# Patient Record
Sex: FEMALE | Race: WHITE | Employment: OTHER | URBAN - METROPOLITAN AREA
[De-identification: names, ages, dates, MRNs, and addresses within clinical notes are randomized per-mention and may not be internally consistent; named-entity substitution may affect disease eponyms.]

---

## 2017-10-04 ENCOUNTER — OFFICE VISIT (OUTPATIENT)
Dept: ENDOCRINOLOGY | Age: 82
End: 2017-10-04

## 2017-10-04 VITALS
SYSTOLIC BLOOD PRESSURE: 156 MMHG | DIASTOLIC BLOOD PRESSURE: 57 MMHG | RESPIRATION RATE: 16 BRPM | BODY MASS INDEX: 33.63 KG/M2 | HEIGHT: 60 IN | OXYGEN SATURATION: 95 % | WEIGHT: 171.3 LBS | HEART RATE: 68 BPM

## 2017-10-04 DIAGNOSIS — E11.22 TYPE 2 DIABETES MELLITUS WITH STAGE 3 CHRONIC KIDNEY DISEASE, WITH LONG-TERM CURRENT USE OF INSULIN (HCC): ICD-10-CM

## 2017-10-04 DIAGNOSIS — E11.49: ICD-10-CM

## 2017-10-04 DIAGNOSIS — N18.30 TYPE 2 DIABETES MELLITUS WITH STAGE 3 CHRONIC KIDNEY DISEASE, WITH LONG-TERM CURRENT USE OF INSULIN (HCC): ICD-10-CM

## 2017-10-04 DIAGNOSIS — Z79.4 TYPE 2 DIABETES MELLITUS WITH HYPERGLYCEMIA, WITH LONG-TERM CURRENT USE OF INSULIN (HCC): Primary | ICD-10-CM

## 2017-10-04 DIAGNOSIS — Z79.4 TYPE 2 DIABETES MELLITUS WITH STAGE 3 CHRONIC KIDNEY DISEASE, WITH LONG-TERM CURRENT USE OF INSULIN (HCC): ICD-10-CM

## 2017-10-04 DIAGNOSIS — M54.10: ICD-10-CM

## 2017-10-04 DIAGNOSIS — E11.65 TYPE 2 DIABETES MELLITUS WITH HYPERGLYCEMIA, WITH LONG-TERM CURRENT USE OF INSULIN (HCC): Primary | ICD-10-CM

## 2017-10-04 DIAGNOSIS — I10 ESSENTIAL HYPERTENSION: ICD-10-CM

## 2017-10-04 RX ORDER — INSULIN DEGLUDEC 100 U/ML
14 INJECTION, SOLUTION SUBCUTANEOUS
COMMUNITY
End: 2022-10-20 | Stop reason: ALTCHOICE

## 2017-10-04 RX ORDER — TRAZODONE HYDROCHLORIDE 50 MG/1
50 TABLET ORAL
COMMUNITY
End: 2022-10-20 | Stop reason: ALTCHOICE

## 2017-10-04 RX ORDER — CELECOXIB 200 MG/1
200 CAPSULE ORAL
COMMUNITY
End: 2022-10-20

## 2017-10-04 NOTE — MR AVS SNAPSHOT
Visit Information Date & Time Provider Department Dept. Phone Encounter #  
 10/4/2017  3:00 PM Piedad Allred MD [facility] [phone] [id number] Upcoming Health Maintenance Date Due DTaP/Tdap/Td series (1 - Tdap) 6/29/1953 Pneumococcal 65+ Low/Medium Risk (1 of 2 - PCV13) 6/29/1997 EYE EXAM RETINAL OR DILATED Q1 6/30/2015 FOOT EXAM Q1 2/11/2016 MICROALBUMIN Q1 2/11/2016 HEMOGLOBIN A1C Q6M 5/6/2016 GLAUCOMA SCREENING Q2Y 6/30/2016 LIPID PANEL Q1 11/6/2016 MEDICARE YEARLY EXAM 11/20/2016 INFLUENZA AGE 9 TO ADULT 8/1/2017 COLONOSCOPY 10/27/2024 Allergies as of 10/4/2017  Review Complete On: 10/4/2017 By: Piedad Allred MD  
  
 Severity Noted Reaction Type Reactions Amoxicillin  04/01/2010    Hives, Itching, Nausea and Vomiting Other Medication  04/01/2010   Intolerance Nausea Only Tylenol with codeine and flexaril Prednisone  04/01/2010    Itching, Nausea and Vomiting Current Immunizations  Reviewed on 10/28/2014 Name Date Influenza Vaccine 10/27/2014, 9/19/2013 Influenza Vaccine Split 10/17/2012, 10/21/2010 Not reviewed this visit You Were Diagnosed With   
  
 Codes Comments Type 2 diabetes mellitus with hyperglycemia, with long-term current use of insulin (HCC)    -  Primary ICD-10-CM: E11.65, Z79.4 ICD-9-CM: 250.00, 790.29, V58.67 Essential hypertension     ICD-10-CM: I10 
ICD-9-CM: 401.9 Vitals BP Pulse Resp Height(growth percentile) Weight(growth percentile) LMP  
 156/57 (BP 1 Location: Left arm, BP Patient Position: Sitting) 68 16 5' (1.524 m) 171 lb 4.8 oz (77.7 kg) 01/01/1987 SpO2 BMI OB Status Smoking Status 95% 33.45 kg/m2 Postmenopausal Former Smoker BMI and BSA Data Body Mass Index Body Surface Area  
 33.45 kg/m 2 1.81 m 2 Preferred Pharmacy Pharmacy Name Phone  [facility] [address] [phone] [facility] Records [phone] Your Updated Medication List  
  
   
This list is accurate as of: 10/4/17  3:02 PM.  Always use your most recent med list.  
  
  
  
  
 ALIGN 4 mg Cap Generic drug:  Bifidobacterium Infantis Take  by mouth. ALPRAZolam 0.25 mg tablet Commonly known as:  [place] Take 1 Tab by mouth nightly as needed. CeleBREX 200 mg capsule Generic drug:  celecoxib Take 200 mg by mouth two (2) times daily as needed for Pain.  
  
 ezetimibe-simvastatin 10-20 mg per tablet Commonly known as:  Vytorin 10/20 Take 1 Tab by mouth nightly. * Insulin Needles (Disposable) 31 gauge x 5/16\" Ndle E11.22 Diabetes type 2 with kidney complications Inject daily * Insulin Needles (Disposable) 31 gauge x 5/16\" Ndle Commonly known as:  PEN NEEDLE  
E11.22 Diabetes type 2 with kidney complications Inject daily  
  
 latanoprost 0.005 % ophthalmic solution Commonly known as:  XALATAN  
  
 nadolol-bendroflumethiazide 40-5 mg per tablet Commonly known as:  Kathyrn Shows Take 1 Tab by mouth daily. omeprazole 20 mg capsule Commonly known as:  PRILOSEC  
TAKE 1 CAPSULE DAILY PARoxetine 30 mg tablet Commonly known as:  PAXIL Take 1 Tab by mouth daily. TRADJENTA 5 mg tablet Generic drug:  linagliptin Take 5 mg by mouth daily. traZODone 50 mg tablet Commonly known as:  Huitron Cower Take 50 mg by mouth nightly. TRESIBA FLEXTOUCH U-100 100 unit/mL (3 mL) Inpn Generic drug:  insulin degludec 14 Units by SubCUTAneous route nightly. valsartan 160 mg tablet Commonly known as:  DIOVAN  
TAKE 1 TABLET DAILY * Notice: This list has 2 medication(s) that are the same as other medications prescribed for you. Read the directions carefully, and ask your doctor or other care provider to review them with you. Introducing [facility]! Dear Laurie Fuentes: Thank you for requesting a [employer] account.   Our records indicate that you have previously registered for a [employer] account but its currently inactive. Please call our [employer] support line at [phone]. Additional Information If you have questions, please visit the Frequently Asked Questions section of the [employer] website at [email or web address] Remember, [employer] is NOT to be used for urgent needs. For medical emergencies, dial 911. Now available from your iPhone and Android! Please provide this summary of care documentation to your next provider. Your primary care clinician is listed as Babar Hoffman. If you have any questions after today's visit, please call [phone].

## 2017-10-04 NOTE — PROGRESS NOTES
[facility]               Mikala Pyle MD        [address] [phone] Fax [phone]               Patient Information  Date:10/5/2017  Name : Tiffany Tran 80 y.o.     YOB: 1932         Referred by: Jamilah Sargent MD         Chief Complaint   Patient presents with    New Patient     self referred for DM       History of Present Illness: Tiffany Tran is a 80 y.o. female here for initial visit of  Type 2 Diabetes Mellitus. She was diagnosed with diabetes 30-40 years ago, was on oral medications, Metformin was discontinued due to CKD and GI side effects. Currently, she is on Tradjenta and Dgprtes62 units. She is checking blood glucose fasting and all less than 130. She lives with her daughter and sometimes needs to go to [place] to live with her other daughter. Diet seems to be healthy. She has nephropathy. No polyuria or polydipsia. No ypoglycemia. Weight has been stable. She has limited activity. Wt Readings from Last 3 Encounters:   10/04/17 171 lb 4.8 oz (77.7 kg)   11/25/15 168 lb 3.2 oz (76.3 kg)   11/20/15 171 lb (77.6 kg)       BP Readings from Last 3 Encounters:   10/04/17 156/57   11/25/15 130/51   11/20/15 129/50           Past Medical History:   Diagnosis Date    Allergic rhinitis due to pollen     Anxiety state, unspecified     Backache, unspecified     Basal cell carcinoma     Chronic maxillary sinusitis     Depression     Diabetes ([facility]     Diabetes mellitus without mention of complication     Dizziness and giddiness     Unspecified disorder of ankle and foot joint     Unspecified essential hypertension     Urinary tract infection, site not specified      Current Outpatient Prescriptions   Medication Sig    linagliptin (TRADJENTA) 5 mg tablet Take 5 mg by mouth daily.  celecoxib (CELEBREX) 200 mg capsule Take 200 mg by mouth two (2) times daily as needed for Pain.  traZODone (DESYREL) 50 mg tablet Take 50 mg by mouth nightly.  insulin degludec (TRESIBA FLEXTOUCH U-100) 100 unit/mL (3 mL) inpn 14 Units by SubCUTAneous route nightly.  Bifidobacterium Infantis (ALIGN) 4 mg cap Take  by mouth.  Insulin Needles, Disposable, 31 gauge x 5/16\" ndle E11.22 Diabetes type 2 with kidney complications Inject daily    Insulin Needles, Disposable, (PEN NEEDLE) 31 gauge x 5/16\" ndle E11.22 Diabetes type 2 with kidney complications Inject daily    PARoxetine (PAXIL) 30 mg tablet Take 1 Tab by mouth daily. (Patient taking differently: Take 40 mg by mouth daily.)    ezetimibe-simvastatin (VYTORIN 10/20) 10-20 mg per tablet Take 1 Tab by mouth nightly.  omeprazole (PRILOSEC) 20 mg capsule TAKE 1 CAPSULE DAILY    valsartan (DIOVAN) 160 mg tablet TAKE 1 TABLET DAILY (Patient taking differently: 360 mg)    ALPRAZolam (XANAX) 0.25 mg tablet Take 1 Tab by mouth nightly as needed.  latanoprost (XALATAN) 0.005 % ophthalmic solution     nadolol-bendroflumethiazide (CORZIDE) 40-5 mg per tablet Take 1 Tab by mouth daily. No current facility-administered medications for this visit. Allergies   Allergen Reactions    Amoxicillin Hives, Itching and Nausea and Vomiting    Other Medication Nausea Only     Tylenol with codeine and flexaril    Prednisone Itching and Nausea and Vomiting       Review of Systems:  All 10 systems reviewed and are negative other than mentioned in HPI    Physical Examination:   Blood pressure 156/57, pulse 68, resp. rate 16, height 5' (1.524 m), weight 171 lb 4.8 oz (77.7 kg), last menstrual period 01/01/1987, SpO2 95 %. Estimated body mass index is 33.45 kg/(m^2) as calculated from the following:    Height as of this encounter: 5' (1.524 m). -   Weight as of this encounter: 171 lb 4.8 oz (77.7 kg).   - General: pleasant, no distress, good eye contact  - HEENT: no pallor, no periorbital edema, EOMI  - Neck: supple, no thyromegaly, no nodules  - Cardiovascular: regular, normal rate, normal S1 and S2,   - Respiratory: clear to auscultation bilaterally  - Gastrointestinal: soft, nontender, nondistended,  BS +  - Musculoskeletal: no proximal muscle weakness in upper or lower extremities  - Integumentary: no acanthosis nigricans,no edema, no foot ulcers  - Neurological: decreased sensation to monofilament in the right LE , no ulcers,alert and oriented  - Psychiatric: normal mood and affect  - Skin: color, texture, turgor normal.       Data Reviewed:     [] Glucose records reviewed. [] See glucose records for details (to be scanned). [] A1C  [] Reviewed labs      Assessment/Plan:     1. Type 2 diabetes mellitus with hyperglycemia, with long-term current use of insulin ([address]    2. Essential hypertension    3. Type 2 diabetes mellitus with stage 3 chronic kidney disease, with long-term current use of insulin ([address]    4. DM radiculopathy ([facility]        1. Type 2 Diabetes Mellitus with nephropathy,neuropathy -   Lab Results   Component Value Date/Time    Hemoglobin A1c 7.0 11/06/2015 12:04 PM     Tresiba 14 units   Tradjenta 5 mg   Advised to check glucose 2  times daily    Diabetic issues reviewed : glycemic goals , written exchange diet given, low carbohydrate diet, weight control , home glucose monitoring emphasized,  hypoglycemia management and long term diabetic complications discussed. 2. HTN : Continue current therapy     3. Hyperlipidemia : Continue statin. 4.Obesity:Body mass index is 33.45 kg/(m^2). Discussed about the importance of  carbohydrate portion control. 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 Follow-up Disposition:  Return in about 6 months (around 4/4/2018). Thank you for allowing me to participate in the care of this patient.     Jennyfer Rivero MD      Patient verbalized understanding

## 2017-10-04 NOTE — PROGRESS NOTES
Ana Stevens is a 80 y.o. female here for   Chief Complaint   Patient presents with    New Patient     self referred for DM       Functional glucose monitor and record keeping system? -yes   Eye exam within last year? - early 2017  Foot exam within last year? - yes    1. Have you been to the ER, urgent care clinic since your last visit? Hospitalized since your last visit? -n/a    2. Have you seen or consulted any other health care providers outside of the [address] [place] [address] since your last visit?   Include any pap smears or colon screening.-n/a      Lab Results   Component Value Date/Time    Hemoglobin A1c 7.0 11/06/2015 12:04 PM       Wt Readings from Last 3 Encounters:   11/25/15 168 lb 3.2 oz (76.3 kg)   11/20/15 171 lb (77.6 kg)   11/06/15 169 lb (76.7 kg)     Temp Readings from Last 3 Encounters:   11/25/15 95.4 °F (35.2 °C) (Oral)   11/20/15 98 °F (36.7 °C) (Oral)   11/06/15 97.3 °F (36.3 °C) (Oral)     BP Readings from Last 3 Encounters:   11/25/15 130/51   11/20/15 129/50   11/06/15 122/45     Pulse Readings from Last 3 Encounters:   11/25/15 (!) 53   11/20/15 (!) 58   11/06/15 (!) 52

## 2017-10-05 PROBLEM — E11.22 TYPE 2 DIABETES MELLITUS WITH STAGE 3 CHRONIC KIDNEY DISEASE, WITH LONG-TERM CURRENT USE OF INSULIN (HCC): Status: ACTIVE | Noted: 2017-10-05

## 2017-10-05 PROBLEM — Z79.4 TYPE 2 DIABETES MELLITUS WITH STAGE 3 CHRONIC KIDNEY DISEASE, WITH LONG-TERM CURRENT USE OF INSULIN (HCC): Status: ACTIVE | Noted: 2017-10-05

## 2017-10-05 PROBLEM — N18.30 TYPE 2 DIABETES MELLITUS WITH STAGE 3 CHRONIC KIDNEY DISEASE, WITH LONG-TERM CURRENT USE OF INSULIN (HCC): Status: ACTIVE | Noted: 2017-10-05

## 2017-10-05 PROBLEM — M54.10: Status: ACTIVE | Noted: 2017-10-05

## 2017-10-05 PROBLEM — E11.49: Status: ACTIVE | Noted: 2017-10-05

## 2020-06-17 ENCOUNTER — IMPORTED ENCOUNTER (OUTPATIENT)
Dept: URBAN - METROPOLITAN AREA CLINIC 59 | Facility: CLINIC | Age: 85
End: 2020-06-17

## 2020-06-17 PROBLEM — E11.3293 TYPE II DM W/ MILD NONPROLIFERATIVE DIABETIC RETINOPATHY W/O MACULAR EDEMA OF BILATERAL EYES: Noted: 2020-06-17

## 2020-06-17 PROBLEM — H40.013 OPEN ANGLE WITH BORDERLINE FINDINGS, LOW RISK, BILATERAL: Noted: 2020-06-17

## 2020-06-17 PROBLEM — H04.123 TEAR FILM INSUFFICIENCY OF BILATERAL LACRIMAL GLANDS: Noted: 2020-06-17

## 2020-06-17 PROBLEM — H25.11 NUCLEAR SCLEROSIS CATARACT OF RT EYE: Noted: 2020-06-17

## 2020-06-17 PROBLEM — H25.12 NUCLEAR SCLEROSIS CATARACT OF LT EYE: Noted: 2020-06-17

## 2020-06-17 PROBLEM — H02.423 MYOGENIC PTOSIS OF BILATERAL EYELIDS: Noted: 2020-06-17

## 2020-06-17 PROBLEM — H35.363 DRUSEN (DEGENERATIVE) OF MACULA, BILATERAL: Noted: 2020-06-17

## 2020-06-17 PROCEDURE — 92133 CPTRZD OPH DX IMG PST SGM ON: CPT

## 2020-06-17 PROCEDURE — 99204 OFFICE O/P NEW MOD 45 MIN: CPT

## 2020-06-23 ENCOUNTER — IMPORTED ENCOUNTER (OUTPATIENT)
Dept: URBAN - METROPOLITAN AREA CLINIC 59 | Facility: CLINIC | Age: 85
End: 2020-06-23

## 2020-06-23 PROBLEM — H04.123 TEAR FILM INSUFFICIENCY OF BILATERAL LACRIMAL GLANDS: Noted: 2020-06-23

## 2020-06-23 PROBLEM — H35.363 DRUSEN (DEGENERATIVE) OF MACULA, BILATERAL: Noted: 2020-06-23

## 2020-06-23 PROBLEM — H25.11 NUCLEAR SCLEROSIS CATARACT OF RT EYE: Noted: 2020-06-23

## 2020-06-23 PROBLEM — H40.033 PRIMARY ANGLE CLOSURE SUSPECT OF BILATERAL EYE: Noted: 2020-06-23

## 2020-06-23 PROBLEM — H02.423 MYOGENIC PTOSIS OF BILATERAL EYELIDS: Noted: 2020-06-23

## 2020-06-23 PROBLEM — H25.12 NUCLEAR SCLEROSIS CATARACT OF LT EYE: Noted: 2020-06-23

## 2020-06-23 PROBLEM — E11.3293 TYPE II DM W/ MILD NONPROLIFERATIVE DIABETIC RETINOPATHY W/O MACULAR EDEMA OF BILATERAL EYES: Noted: 2020-06-23

## 2020-06-23 PROCEDURE — 92012 INTRM OPH EXAM EST PATIENT: CPT

## 2020-06-23 PROCEDURE — 92134 CPTRZ OPH DX IMG PST SGM RTA: CPT

## 2020-06-23 PROCEDURE — 92136 OPHTHALMIC BIOMETRY: CPT

## 2020-07-09 ENCOUNTER — IMPORTED ENCOUNTER (OUTPATIENT)
Dept: URBAN - METROPOLITAN AREA CLINIC 59 | Facility: CLINIC | Age: 85
End: 2020-07-09

## 2020-07-09 PROCEDURE — 66984 XCAPSL CTRC RMVL W/O ECP: CPT

## 2020-07-10 ENCOUNTER — IMPORTED ENCOUNTER (OUTPATIENT)
Dept: URBAN - METROPOLITAN AREA CLINIC 59 | Facility: CLINIC | Age: 85
End: 2020-07-10

## 2020-07-10 PROBLEM — Z96.1 PRESENCE OF PSEUDOPHAKIA: Noted: 2020-07-10

## 2020-07-10 PROCEDURE — 99024 POSTOP FOLLOW-UP VISIT: CPT

## 2020-07-13 ENCOUNTER — IMPORTED ENCOUNTER (OUTPATIENT)
Dept: URBAN - METROPOLITAN AREA CLINIC 59 | Facility: CLINIC | Age: 85
End: 2020-07-13

## 2020-07-13 PROCEDURE — 92136 OPHTHALMIC BIOMETRY: CPT

## 2020-07-16 ENCOUNTER — IMPORTED ENCOUNTER (OUTPATIENT)
Dept: URBAN - METROPOLITAN AREA CLINIC 59 | Facility: CLINIC | Age: 85
End: 2020-07-16

## 2020-07-16 PROCEDURE — 66984 XCAPSL CTRC RMVL W/O ECP: CPT

## 2020-07-17 ENCOUNTER — IMPORTED ENCOUNTER (OUTPATIENT)
Dept: URBAN - METROPOLITAN AREA CLINIC 59 | Facility: CLINIC | Age: 85
End: 2020-07-17

## 2020-07-17 PROBLEM — Z96.1 PRESENCE OF PSEUDOPHAKIA: Noted: 2020-07-17

## 2020-07-17 PROCEDURE — 99024 POSTOP FOLLOW-UP VISIT: CPT

## 2020-08-05 ENCOUNTER — IMPORTED ENCOUNTER (OUTPATIENT)
Dept: URBAN - METROPOLITAN AREA CLINIC 59 | Facility: CLINIC | Age: 85
End: 2020-08-05

## 2020-08-05 PROBLEM — Z96.1 PRESENCE OF PSEUDOPHAKIA: Noted: 2020-08-05

## 2020-08-05 PROCEDURE — 99024 POSTOP FOLLOW-UP VISIT: CPT

## 2021-08-03 PROBLEM — I10 UNSPECIFIED ESSENTIAL HYPERTENSION: Status: RESOLVED | Noted: 2021-08-03 | Resolved: 2021-08-03

## 2022-03-18 PROBLEM — E11.22 TYPE 2 DIABETES MELLITUS WITH STAGE 3 CHRONIC KIDNEY DISEASE, WITH LONG-TERM CURRENT USE OF INSULIN (HCC): Status: ACTIVE | Noted: 2017-10-05

## 2022-03-18 PROBLEM — E11.49: Status: ACTIVE | Noted: 2017-10-05

## 2022-03-18 PROBLEM — I10 ESSENTIAL HYPERTENSION: Status: ACTIVE | Noted: 2017-10-04

## 2022-03-18 PROBLEM — N18.30 TYPE 2 DIABETES MELLITUS WITH STAGE 3 CHRONIC KIDNEY DISEASE, WITH LONG-TERM CURRENT USE OF INSULIN (HCC): Status: ACTIVE | Noted: 2017-10-05

## 2022-03-18 PROBLEM — Z79.4 TYPE 2 DIABETES MELLITUS WITH STAGE 3 CHRONIC KIDNEY DISEASE, WITH LONG-TERM CURRENT USE OF INSULIN (HCC): Status: ACTIVE | Noted: 2017-10-05

## 2022-03-18 PROBLEM — M54.10: Status: ACTIVE | Noted: 2017-10-05

## 2022-10-20 ENCOUNTER — OFFICE VISIT (OUTPATIENT)
Dept: FAMILY MEDICINE CLINIC | Age: 87
End: 2022-10-20
Payer: MEDICARE

## 2022-10-20 VITALS
OXYGEN SATURATION: 95 % | WEIGHT: 163.2 LBS | HEIGHT: 60 IN | DIASTOLIC BLOOD PRESSURE: 31 MMHG | BODY MASS INDEX: 32.04 KG/M2 | RESPIRATION RATE: 16 BRPM | TEMPERATURE: 98.2 F | HEART RATE: 65 BPM | SYSTOLIC BLOOD PRESSURE: 71 MMHG

## 2022-10-20 DIAGNOSIS — N18.4 TYPE 2 DIABETES MELLITUS WITH STAGE 4 CHRONIC KIDNEY DISEASE, WITH LONG-TERM CURRENT USE OF INSULIN (HCC): Primary | ICD-10-CM

## 2022-10-20 DIAGNOSIS — J44.9 CHRONIC OBSTRUCTIVE PULMONARY DISEASE, UNSPECIFIED COPD TYPE (HCC): ICD-10-CM

## 2022-10-20 DIAGNOSIS — E11.22 TYPE 2 DIABETES MELLITUS WITH STAGE 4 CHRONIC KIDNEY DISEASE, WITH LONG-TERM CURRENT USE OF INSULIN (HCC): Primary | ICD-10-CM

## 2022-10-20 DIAGNOSIS — D63.1 ANEMIA DUE TO STAGE 4 CHRONIC KIDNEY DISEASE (HCC): ICD-10-CM

## 2022-10-20 DIAGNOSIS — Z79.899 ENCOUNTER FOR LONG-TERM (CURRENT) USE OF OTHER MEDICATIONS: ICD-10-CM

## 2022-10-20 DIAGNOSIS — Z79.4 TYPE 2 DIABETES MELLITUS WITH STAGE 4 CHRONIC KIDNEY DISEASE, WITH LONG-TERM CURRENT USE OF INSULIN (HCC): Primary | ICD-10-CM

## 2022-10-20 DIAGNOSIS — G89.29 CHRONIC LEFT SHOULDER PAIN: ICD-10-CM

## 2022-10-20 DIAGNOSIS — K21.9 GASTROESOPHAGEAL REFLUX DISEASE WITHOUT ESOPHAGITIS: ICD-10-CM

## 2022-10-20 DIAGNOSIS — N18.4 STAGE 4 CHRONIC KIDNEY DISEASE (HCC): ICD-10-CM

## 2022-10-20 DIAGNOSIS — F03.C4 SEVERE DEMENTIA WITH ANXIETY, UNSPECIFIED DEMENTIA TYPE: ICD-10-CM

## 2022-10-20 DIAGNOSIS — N18.4 ANEMIA DUE TO STAGE 4 CHRONIC KIDNEY DISEASE (HCC): ICD-10-CM

## 2022-10-20 DIAGNOSIS — Z23 ENCOUNTER FOR IMMUNIZATION: ICD-10-CM

## 2022-10-20 DIAGNOSIS — M25.512 CHRONIC LEFT SHOULDER PAIN: ICD-10-CM

## 2022-10-20 DIAGNOSIS — M25.552 HIP PAIN, LEFT: ICD-10-CM

## 2022-10-20 PROCEDURE — G0008 ADMIN INFLUENZA VIRUS VAC: HCPCS | Performed by: FAMILY MEDICINE

## 2022-10-20 PROCEDURE — 90694 VACC AIIV4 NO PRSRV 0.5ML IM: CPT | Performed by: FAMILY MEDICINE

## 2022-10-20 PROCEDURE — 1123F ACP DISCUSS/DSCN MKR DOCD: CPT | Performed by: FAMILY MEDICINE

## 2022-10-20 PROCEDURE — 99204 OFFICE O/P NEW MOD 45 MIN: CPT | Performed by: FAMILY MEDICINE

## 2022-10-20 RX ORDER — CETIRIZINE HYDROCHLORIDE 10 MG/1
CAPSULE, LIQUID FILLED ORAL
COMMUNITY
End: 2022-10-20

## 2022-10-20 RX ORDER — DOCUSATE SODIUM 100 MG/1
100 CAPSULE, LIQUID FILLED ORAL 2 TIMES DAILY
COMMUNITY

## 2022-10-20 RX ORDER — MONTELUKAST SODIUM 5 MG/1
10 TABLET, CHEWABLE ORAL
COMMUNITY
End: 2022-10-20 | Stop reason: ALTCHOICE

## 2022-10-20 RX ORDER — MELATONIN 5 MG
5 CAPSULE ORAL
COMMUNITY

## 2022-10-20 RX ORDER — SERTRALINE HYDROCHLORIDE 100 MG/1
TABLET, FILM COATED ORAL DAILY
COMMUNITY

## 2022-10-20 RX ORDER — GLUCOSAMINE SULFATE 1500 MG
POWDER IN PACKET (EA) ORAL DAILY
COMMUNITY

## 2022-10-20 RX ORDER — DOXAZOSIN 2 MG/1
2 TABLET ORAL DAILY
COMMUNITY
End: 2022-10-20 | Stop reason: SINTOL

## 2022-10-20 RX ORDER — METOPROLOL TARTRATE 50 MG/1
TABLET ORAL 2 TIMES DAILY
COMMUNITY

## 2022-10-20 RX ORDER — OMEPRAZOLE 40 MG/1
40 CAPSULE, DELAYED RELEASE ORAL DAILY
COMMUNITY
End: 2022-10-20 | Stop reason: SDUPTHER

## 2022-10-20 RX ORDER — NYSTATIN 100000 [USP'U]/G
POWDER TOPICAL 4 TIMES DAILY
COMMUNITY

## 2022-10-20 RX ORDER — DONEPEZIL HYDROCHLORIDE 5 MG/1
TABLET, FILM COATED ORAL
COMMUNITY

## 2022-10-20 RX ORDER — NYSTATIN AND TRIAMCINOLONE ACETONIDE 100000; 1 [USP'U]/G; MG/G
CREAM TOPICAL 2 TIMES DAILY
COMMUNITY

## 2022-10-20 RX ORDER — EZETIMIBE 10 MG/1
TABLET ORAL
COMMUNITY

## 2022-10-20 RX ORDER — DULAGLUTIDE 0.75 MG/.5ML
0.75 INJECTION, SOLUTION SUBCUTANEOUS
COMMUNITY

## 2022-10-20 RX ORDER — GABAPENTIN 300 MG/1
300 CAPSULE ORAL
COMMUNITY

## 2022-10-20 RX ORDER — SIMVASTATIN 20 MG/1
TABLET, FILM COATED ORAL
COMMUNITY

## 2022-10-20 RX ORDER — GABAPENTIN 100 MG/1
200 CAPSULE ORAL EVERY MORNING
COMMUNITY

## 2022-10-20 RX ORDER — OMEPRAZOLE 20 MG/1
40 CAPSULE, DELAYED RELEASE ORAL DAILY
Qty: 30 CAPSULE | Refills: 2 | Status: SHIPPED | OUTPATIENT
Start: 2022-10-20

## 2022-10-20 RX ORDER — OLMESARTAN MEDOXOMIL 20 MG/1
20 TABLET ORAL DAILY
COMMUNITY
End: 2022-10-20

## 2022-10-20 NOTE — PROGRESS NOTES
[address] [facility]    History of Present Illness:   Chacorta Mcmillan is a 80 y.o. female here for   Chief Complaint   Patient presents with    Establish Care    Leg Pain     Inner thigh pain from fall a month ago. Other     Neurology referral          HPI:  New patient here to [facility]. She moved here from [place]. She has a history of dementia but never saw neurology. Her daughter says her Mini-Mental is 8 out of 30. She was started on Aricept at her previous PCP. She has a history of atrial fib, syncope - had pacemaker 2018. Due for pacemaker check. Has cards here--[facility]. She complains of left upper leg thigh pain since a fall a month ago. She is very unsteady on her feet. She does have a cane to use. Reviewed her medicines and she is not taking Xanax, trazodone, Paxil or Diovan. She has a history of left rotator cuff injury and has chronic left shoulder pain from that. She is on Eliquis for A. fib. She had labs done in August:  Her last A1c was 6.9 so insulin was stopped. She is only on trulicity now. Last hemoglobin 11.9 creatinine was 1.46. TSH was normal.    She is told in the past that she mild asthma. Former smoker but stopped in her 35s.         Health Maintenance  Health Maintenance Due   Topic Date Due    Pneumococcal 65+ years (1 - PCV) Never done    Depression Monitoring  Never done    Foot Exam Q1  02/11/2016    MICROALBUMIN Q1  02/11/2016    Eye Exam Retinal or Dilated  06/30/2016       Past Medical, Family, and Social History:     Past Medical History:   Diagnosis Date    Allergic rhinitis due to pollen     Anxiety state, unspecified     Backache, unspecified     Basal cell carcinoma     Chronic maxillary sinusitis     Depression     Diabetes ([facility] [facility]     Diabetes mellitus without mention of complication     Dizziness and giddiness     Unspecified disorder of ankle and foot joint     Unspecified essential hypertension     Urinary tract infection, site not specified       Past Surgical History:   Procedure Laterality Date    BREAST SURGERY PROCEDURE UNLISTED      HX GYN         Current Outpatient Medications on File Prior to Visit   Medication Sig Dispense Refill    gabapentin (NEURONTIN) 100 mg capsule Take 200 mg by mouth Every morning. 200 in AM      gabapentin (NEURONTIN) 300 mg capsule Take 300 mg by mouth nightly. melatonin 5 mg cap capsule Take 5 mg by mouth nightly. 2 tabs      metoprolol tartrate (LOPRESSOR) 50 mg tablet Take  by mouth two (2) times a day. 1 1/2 tab bid      nystatin-triamcinolone (MYCOLOG II) topical cream Apply  to affected area two (2) times a day. nystatin (MYCOSTATIN) powder Apply  to affected area four (4) times daily. sertraline (ZOLOFT) 100 mg tablet Take  by mouth daily. simvastatin (ZOCOR) 20 mg tablet Take  by mouth nightly. dulaglutide (Trulicity) 1.08 NA/3.1 mL sub-q pen 0.75 mg by SubCUTAneous route every seven (7) days. cholecalciferol (Vitamin D3) 25 mcg (1,000 unit) cap Take  by mouth daily. Cetirizine (ZyrTEC) 10 mg cap Take  by mouth.      ezetimibe (ZETIA) 10 mg tablet Take  by mouth.      montelukast (SINGULAIR) 5 mg chewable tablet Take 10 mg by mouth nightly. donepeziL (ARICEPT) 5 mg tablet Take  by mouth nightly. Chaneta Ho 192-77-48 mg-mg-million tab Take  by mouth. apixaban (Eliquis) 5 mg tablet Take 5 mg by mouth two (2) times a day. docusate sodium (COLACE) 100 mg capsule Take 100 mg by mouth two (2) times a day. Bifidobacterium Infantis 4 mg cap Take  by mouth.       Insulin Needles, Disposable, 31 gauge x 5/16\" ndle E11.22 Diabetes type 2 with kidney complications Inject daily 1 Package 11    Insulin Needles, Disposable, (PEN NEEDLE) 31 gauge x 5/16\" ndle E11.22 Diabetes type 2 with kidney complications Inject daily 1 Package 0    latanoprost (XALATAN) 0.005 % ophthalmic solution       doxazosin (CARDURA) 2 mg tablet Take 2 mg by mouth daily.      omeprazole (PRILOSEC) 40 mg capsule Take 40 mg by mouth daily. olmesartan (BENICAR) 20 mg tablet Take 20 mg by mouth daily. No current facility-administered medications on file prior to visit. Patient Active Problem List   Diagnosis Code    Urinary tract infection, site not specified N39.0    Dizziness and giddiness R42    Anxiety state, unspecified F41.1    Chronic maxillary sinusitis J32.0    Allergic rhinitis due to pollen J30.1    Depression F32. A    Unspecified disorder of ankle and foot joint M25.9    De Quervain's disease (tenosynovitis) M65.4    Obesity E66.9    Seborrheic keratosis L82.1    Diabetes mellitus ([facility] [address] E11.9    Neck pain M54.2    Fall W19. XXXA    Type 2 diabetes mellitus with diabetic chronic kidney disease (HCC) E11.22    Essential hypertension I10    Type 2 diabetes mellitus with stage 3 chronic kidney disease, with long-term current use of insulin ([facility]) E11.22, N18.30, Z79.4    DM radiculopathy ([facility]) E11.49, M54.10       Social History     Socioeconomic History    Marital status:    Tobacco Use    Smoking status: Former     Packs/day: 0.50     Types: Cigarettes     Quit date: 1975     Years since quittin.8    Smokeless tobacco: Never    Tobacco comments:     quit over twenty years ago. Substance and Sexual Activity    Alcohol use: No     Comment: wine at times    Drug use: No    Sexual activity: Never     Social Determinants of Health     Financial Resource Strain: Low Risk     Difficulty of Paying Living Expenses: Not hard at all   Food Insecurity: No Food Insecurity    Worried About Running Out of Food in the Last Year: Never true    Ran Out of Food in the Last Year: Never true        Review of Systems   Review of Systems   Constitutional:  Negative for chills and fever. Respiratory:  Negative for cough and shortness of breath. Cardiovascular:  Negative for chest pain. Gastrointestinal:  Positive for constipation. Musculoskeletal:  Positive for joint pain. L hip and shoulder   Psychiatric/Behavioral:  Positive for depression and memory loss.       Objective:   Visit Vitals  BP (!) 71/31 (BP 1 Location: Right upper arm, BP Patient Position: Sitting, BP Cuff Size: Large adult)   Pulse 65   Temp 98.2 °F (36.8 °C) (Oral)   Resp 16   Ht 5' (1.524 m)   Wt 163 lb 3.2 oz (74 kg)   LMP 01/01/1987   SpO2 95%   BMI 31.87 kg/m²        Physical Exam  PHYSICAL EXAM:  Gen: Pt sitting in chair, in NAD  Head: Normocephalic, atraumatic  Eyes: Sclera anicteric, EOM grossly intact,  Ears: TM's pearly with good light reflex b/l  Neck: Supple, no carotid bruits  CVS: Normal S1, S2, no m/r/g  Resp: CTAB, no wheezes or rales  Abd: Soft,  non-distended, +BS, mildly tender in LLQ  Extrem: Atraumatic, no cyanosis, 1+ edema  Pulses: 2+   Skin: Warm, dry  Neuro: Alert, oriented, appropriate    Pertinent Labs/Studies:  3 most recent PHQ Screens 10/20/2022   PHQ Not Done -   Little interest or pleasure in doing things More than half the days   Feeling down, depressed, irritable, or hopeless Nearly every day   Total Score PHQ 2 5   Trouble falling or staying asleep, or sleeping too much Nearly every day   Feeling tired or having little energy Nearly every day   Poor appetite, weight loss, or overeating Several days   Feeling bad about yourself - or that you are a failure or have let yourself or your family down Nearly every day   Trouble concentrating on things such as school, work, reading, or watching TV Several days   Moving or speaking so slowly that other people could have noticed; or the opposite being so fidgety that others notice Several days   Thoughts of being better off dead, or hurting yourself in some way Not at all   PHQ 9 Score 17      YASIR 2/7 10/20/2022   Feeling nervous, anxious, or on edge 2   Not being able to stop or control worrying 1   Worrying too much about different things 3 Trouble relaxing 3   Being so restless that it is hard to sit still 0   Becoming easily annoyed or irritable 1   Feeling afraid as if something awful might happen 0   YASIR-7 Total Score 10      XR Results (most recent):  Results from Appointment encounter on 10/20/22    XR HIP LT W OR WO PELV 2-3 VWS    Narrative  EXAM: XR HIP LT W OR WO PELV 2-3 VWS  Clinical history: Hip pain  INDICATION: Hip pain. COMPARISON: None. FINDINGS:  AP view of the pelvis and a frogleg lateral view of the left hip demonstrate no  fracture, dislocation or other acute abnormality. Osteopenia. Peripheral  arterial disease. Mild for age femoral acetabular joint space narrowing. Impression  No acute abnormality. Assessment and orders:       ICD-10-CM ICD-9-CM    1. Type 2 diabetes mellitus with stage 3a chronic kidney disease, with long-term current use of insulin ([facility])  E11.22 250.40 HEMOGLOBIN A1C WITH EAG    N18.31 585.3 MICROALBUMIN, UR, RAND W/ MICROALB/CREAT RATIO    Z79.4 V58.67 MICROALBUMIN, UR, RAND W/ MICROALB/CREAT RATIO      HEMOGLOBIN A1C WITH EAG      2. Encounter for immunization  Z23 V03.89 INFLUENZA, FLUAD, (AGE 72 Y+), IM, PF, 0.5 ML      3. Hip pain, left  M25.552 719.45 REFERRAL TO HOME HEALTH      XR HIP LT W OR WO PELV 2-3 VWS      REFERRAL TO ORTHOPEDICS      4. Chronic left shoulder pain  M25.512 719.41 REFERRAL TO HOME HEALTH    G89.29 338.29 REFERRAL TO ORTHOPEDICS      5. Encounter for long-term (current) use of other medications  Z79.899 V58.69 CBC WITH AUTOMATED DIFF      METABOLIC PANEL, COMPREHENSIVE      METABOLIC PANEL, COMPREHENSIVE      CBC WITH AUTOMATED DIFF      6. Severe dementia with anxiety, unspecified dementia type  F03. C4 294.21 REFERRAL TO NEUROLOGY      7. Gastroesophageal reflux disease without esophagitis  K21.9 530.81 omeprazole (PRILOSEC) 20 mg capsule      8.  Chronic obstructive pulmonary disease, unspecified COPD type ([facility])  J44.9 496 tiotropium (SPIRIVA) 18 mcg inhalation capsule        Diagnoses and all orders for this visit:    1. Type 2 diabetes mellitus with stage 3a chronic kidney disease, with long-term current use of insulin (HCC)  -     HEMOGLOBIN A1C WITH EAG; Future  -     MICROALBUMIN, UR, RAND W/ MICROALB/CREAT RATIO; Future    2. Encounter for immunization  -     INFLUENZA, FLUAD, (AGE 65 Y+), IM, PF, 0.5 ML    3. Hip pain, left  -     REFERRAL TO HOME HEALTH  -     XR HIP LT W OR WO PELV 2-3 VWS; Future  -     REFERRAL TO ORTHOPEDICS    4. Chronic left shoulder pain  -     REFERRAL TO HOME HEALTH  -     REFERRAL TO ORTHOPEDICS    5. Encounter for long-term (current) use of other medications  -     CBC WITH AUTOMATED DIFF; Future  -     METABOLIC PANEL, COMPREHENSIVE; Future    6. Severe dementia with anxiety, unspecified dementia type  -     REFERRAL TO NEUROLOGY    7. Gastroesophageal reflux disease without esophagitis  -     omeprazole (PRILOSEC) 20 mg capsule; Take 2 Capsules by mouth daily. 8. Chronic obstructive pulmonary disease, unspecified COPD type ([facility]  -     tiotropium (SPIRIVA) 18 mcg inhalation capsule; Take 1 Capsule by inhalation daily. the following changes in treatment are made: Stop cetrizine, cardura (doxazosin), montelukast, olmesartan. Reduce and consider stopping omeprazole due to renal issues. Add spiriva inhaler. lab results and schedule of future lab studies reviewed with patient  reviewed medications and side effects in detail    RECOMMENDATIONS given include: Recommended increased dietary fluid for constipation. Advised colace, fiber every day; use senna and miralax prn. F/U with MD if symptoms worsen or fail to resolve or for new associated symptoms. Advised otc tylenol arthritis 2 tabs every 12 hr, topical diclofenac, lidocaine patch 12 hr on and 12 hr off. F/U ASAP for worsening pain or swelling or decreased ROM. I have discussed the diagnosis with the patient and the intended plan as seen in the above orders.   Social history, medical history, and labs were reviewed. The patient has received an after-visit summary and questions were answered concerning future plans. I have discussed medication side effects and warnings with the patient as well. Patient/guardian verbalized understanding and accepts plan & risks. Please note that this dictation was completed with [employer]+, the computer voice recognition software. Quite often unanticipated grammatical, syntax, homophones, and other interpretive errors are inadvertently transcribed by the computer software. Please disregard these errors. Please excuse any errors that have escaped final proofreading. Thank you.      MD JOVON Sibley & JENNY SPRINGER [facility]  10/20/22

## 2022-10-20 NOTE — PATIENT INSTRUCTIONS
RECOMMENDATIONS given include: Recommended increased dietary fluid for constipation. Advised colace, fiber every day; use senna and miralax prn. F/U with MD if symptoms worsen or fail to resolve or for new associated symptoms. Advised otc tylenol arthritis 2 tabs every 12 hr, topical diclofenac, lidocaine patch 12 hr on and 12 hr off. F/U ASAP for worsening pain or swelling or decreased ROM. Stop cetrizine, cardura (doxazosin), montelukast, olmesartan. Reduce omeprazole to 20 mg daily. Add inhaler.

## 2022-10-21 LAB
ALBUMIN SERPL-MCNC: 3.5 G/DL (ref 3.5–5)
ALBUMIN/GLOB SERPL: 1 {RATIO} (ref 1.1–2.2)
ALP SERPL-CCNC: 96 U/L (ref 45–117)
ALT SERPL-CCNC: 16 U/L (ref 12–78)
ANION GAP SERPL CALC-SCNC: 8 MMOL/L (ref 5–15)
AST SERPL-CCNC: 15 U/L (ref 15–37)
BASOPHILS # BLD: 0 K/UL (ref 0–0.1)
BASOPHILS NFR BLD: 0 % (ref 0–1)
BILIRUB SERPL-MCNC: 0.3 MG/DL (ref 0.2–1)
BUN SERPL-MCNC: 53 MG/DL (ref 6–20)
BUN/CREAT SERPL: 30 (ref 12–20)
CALCIUM SERPL-MCNC: 9 MG/DL (ref 8.5–10.1)
CHLORIDE SERPL-SCNC: 110 MMOL/L (ref 97–108)
CO2 SERPL-SCNC: 24 MMOL/L (ref 21–32)
CREAT SERPL-MCNC: 1.77 MG/DL (ref 0.55–1.02)
CREAT UR-MCNC: 132 MG/DL
DIFFERENTIAL METHOD BLD: ABNORMAL
EOSINOPHIL # BLD: 0.2 K/UL (ref 0–0.4)
EOSINOPHIL NFR BLD: 2 % (ref 0–7)
ERYTHROCYTE [DISTWIDTH] IN BLOOD BY AUTOMATED COUNT: 14.4 % (ref 11.5–14.5)
EST. AVERAGE GLUCOSE BLD GHB EST-MCNC: 131 MG/DL
GLOBULIN SER CALC-MCNC: 3.4 G/DL (ref 2–4)
GLUCOSE SERPL-MCNC: 85 MG/DL (ref 65–100)
HBA1C MFR BLD: 6.2 % (ref 4–5.6)
HCT VFR BLD AUTO: 36.2 % (ref 35–47)
HGB BLD-MCNC: 10.9 G/DL (ref 11.5–16)
IMM GRANULOCYTES # BLD AUTO: 0 K/UL (ref 0–0.04)
IMM GRANULOCYTES NFR BLD AUTO: 0 % (ref 0–0.5)
LYMPHOCYTES # BLD: 1.6 K/UL (ref 0.8–3.5)
LYMPHOCYTES NFR BLD: 21 % (ref 12–49)
MCH RBC QN AUTO: 28.4 PG (ref 26–34)
MCHC RBC AUTO-ENTMCNC: 30.1 G/DL (ref 30–36.5)
MCV RBC AUTO: 94.3 FL (ref 80–99)
MICROALBUMIN UR-MCNC: 7.07 MG/DL
MICROALBUMIN/CREAT UR-RTO: 54 MG/G (ref 0–30)
MONOCYTES # BLD: 0.6 K/UL (ref 0–1)
MONOCYTES NFR BLD: 8 % (ref 5–13)
NEUTS SEG # BLD: 5.2 K/UL (ref 1.8–8)
NEUTS SEG NFR BLD: 69 % (ref 32–75)
NRBC # BLD: 0 K/UL (ref 0–0.01)
NRBC BLD-RTO: 0 PER 100 WBC
PLATELET # BLD AUTO: 221 K/UL (ref 150–400)
PMV BLD AUTO: 11.1 FL (ref 8.9–12.9)
POTASSIUM SERPL-SCNC: 5.2 MMOL/L (ref 3.5–5.1)
PROT SERPL-MCNC: 6.9 G/DL (ref 6.4–8.2)
RBC # BLD AUTO: 3.84 M/UL (ref 3.8–5.2)
SODIUM SERPL-SCNC: 142 MMOL/L (ref 136–145)
WBC # BLD AUTO: 7.7 K/UL (ref 3.6–11)

## 2022-10-23 NOTE — PROGRESS NOTES
Called--no answer. Her A1c is well below goal so she can stop Trulicity. Her potassium was slightly elevated, limit bananas potatoes and oranges. Her kidney function is decreased so needs to avoid Advil and Aleve as well as other NSAIDs. She is spilling small proteins in her urine as well. She is slightly anemic so I advise a multivitamin daily. Have her come back in 4 to 6 weeks for repeat labs. Advise daughter to monitor blood pressure off of olmesartan.

## 2022-10-24 ENCOUNTER — TELEPHONE (OUTPATIENT)
Dept: FAMILY MEDICINE CLINIC | Age: 87
End: 2022-10-24

## 2022-10-24 NOTE — TELEPHONE ENCOUNTER
VM left for patient to call the office regarding provider's message:    Called--no answer. \"Her A1c is well below goal so she can stop Trulicity. Her potassium was slightly elevated, limit bananas potatoes and oranges. Her kidney function is decreased so needs to avoid Advil and Aleve as well as other NSAIDs. She is spilling small proteins in her urine as well. She is slightly anemic so I advise a multivitamin daily. Have her come back in 4 to 6 weeks for repeat labs. Advise daughter to monitor blood pressure off of olmesartan. \"

## 2022-10-25 PROBLEM — G47.30 SLEEP APNEA: Status: ACTIVE | Noted: 2022-10-25

## 2022-10-25 PROBLEM — H40.9 GLAUCOMA: Status: ACTIVE | Noted: 2022-10-25

## 2022-10-25 PROBLEM — E78.5 HYPERLIPIDEMIA: Status: ACTIVE | Noted: 2022-10-25

## 2022-10-25 PROBLEM — G62.9 NEUROPATHY: Status: ACTIVE | Noted: 2022-10-25

## 2022-10-25 PROBLEM — M54.10: Status: RESOLVED | Noted: 2017-10-05 | Resolved: 2022-10-25

## 2022-10-25 PROBLEM — I48.91 ATRIAL FIBRILLATION (HCC): Status: ACTIVE | Noted: 2022-10-25

## 2022-10-25 PROBLEM — F03.90 DEMENTIA (HCC): Status: ACTIVE | Noted: 2022-10-25

## 2022-10-25 PROBLEM — E11.49: Status: RESOLVED | Noted: 2017-10-05 | Resolved: 2022-10-25

## 2022-10-25 PROBLEM — D64.89 OTHER SPECIFIED ANEMIAS: Status: ACTIVE | Noted: 2022-10-25

## 2022-11-09 ENCOUNTER — TELEPHONE (OUTPATIENT)
Dept: FAMILY MEDICINE CLINIC | Age: 87
End: 2022-11-09

## 2022-11-09 NOTE — TELEPHONE ENCOUNTER
Belkys Chidiing states pt went to Er for disorientation and weakness. Pt is also having respiratory issues. While at Er her Pulse ox was 86-90. Pt is in need of a nebulizer and solution. Belkys Chidibruno states pt also needs and order for [address]. Pt may also have a UTI. Please advise at pt's appt tomorrow.

## 2022-11-10 ENCOUNTER — OFFICE VISIT (OUTPATIENT)
Dept: FAMILY MEDICINE CLINIC | Age: 87
End: 2022-11-10
Payer: MEDICARE

## 2022-11-10 DIAGNOSIS — I48.0 PAROXYSMAL ATRIAL FIBRILLATION (HCC): ICD-10-CM

## 2022-11-10 DIAGNOSIS — Z09 HOSPITAL DISCHARGE FOLLOW-UP: ICD-10-CM

## 2022-11-10 DIAGNOSIS — F03.90 DEMENTIA WITHOUT BEHAVIORAL DISTURBANCE, PSYCHOTIC DISTURBANCE, MOOD DISTURBANCE, OR ANXIETY, UNSPECIFIED DEMENTIA SEVERITY, UNSPECIFIED DEMENTIA TYPE (HCC): ICD-10-CM

## 2022-11-10 DIAGNOSIS — J44.9 CHRONIC OBSTRUCTIVE PULMONARY DISEASE, UNSPECIFIED COPD TYPE (HCC): Primary | ICD-10-CM

## 2022-11-10 PROCEDURE — 1123F ACP DISCUSS/DSCN MKR DOCD: CPT | Performed by: FAMILY MEDICINE

## 2022-11-10 PROCEDURE — 99214 OFFICE O/P EST MOD 30 MIN: CPT | Performed by: FAMILY MEDICINE

## 2022-11-10 PROCEDURE — 1111F DSCHRG MED/CURRENT MED MERGE: CPT | Performed by: FAMILY MEDICINE

## 2022-11-10 RX ORDER — AZELASTINE 1 MG/ML
SPRAY, METERED NASAL
COMMUNITY
Start: 2022-07-07 | End: 2022-12-01

## 2022-11-10 RX ORDER — IPRATROPIUM BROMIDE AND ALBUTEROL SULFATE 2.5; .5 MG/3ML; MG/3ML
3 SOLUTION RESPIRATORY (INHALATION)
Qty: 1 EACH | Refills: 5 | Status: SHIPPED | OUTPATIENT
Start: 2022-11-10

## 2022-11-10 RX ORDER — CEPHALEXIN 500 MG/1
CAPSULE ORAL
COMMUNITY
Start: 2022-11-06 | End: 2022-12-01 | Stop reason: ALTCHOICE

## 2022-11-10 NOTE — PROGRESS NOTES
1. \"Have you been to the ER, urgent care clinic since your last visit? Hospitalized since your last visit? \" No    2. \"Have you seen or consulted any other health care providers outside of the [address] [place] [address] since your last visit? \" No     3. For patients aged 39-70: Has the patient had a colonoscopy / FIT/ Cologuard? NA - based on age      If the patient is female:    4. For patients aged 41-77: Has the patient had a mammogram within the past 2 years? NA - based on age or sex      11. For patients aged 21-65: Has the patient had a pap smear?  NA - based on age or sex    Health Maintenance Due   Topic Date Due    Pneumococcal 65+ years (1 - PCV) Never done    Foot Exam Q1  02/11/2016    Eye Exam Retinal or Dilated  06/30/2016    Medicare Yearly Exam  10/20/2022

## 2022-11-11 ENCOUNTER — TELEPHONE (OUTPATIENT)
Dept: FAMILY MEDICINE CLINIC | Age: 87
End: 2022-11-11

## 2022-11-11 VITALS
RESPIRATION RATE: 16 BRPM | OXYGEN SATURATION: 97 % | HEART RATE: 67 BPM | BODY MASS INDEX: 31.06 KG/M2 | TEMPERATURE: 98 F | HEIGHT: 60 IN | WEIGHT: 158.2 LBS | SYSTOLIC BLOOD PRESSURE: 114 MMHG | DIASTOLIC BLOOD PRESSURE: 44 MMHG

## 2022-11-11 NOTE — TELEPHONE ENCOUNTER
Can you sign chart so I can fax patient's office note. I wrote a note and added oxygen levels to vital signs.

## 2022-11-11 NOTE — PROGRESS NOTES
Patient's O2 at rest was 97%. O2 on exertion dropped to 89%. O2 at recovery was 97% with O2 on 2 Liters.

## 2022-11-28 ENCOUNTER — TELEPHONE (OUTPATIENT)
Dept: FAMILY MEDICINE CLINIC | Age: 87
End: 2022-11-28

## 2022-11-28 DIAGNOSIS — F03.90 DEMENTIA WITHOUT BEHAVIORAL DISTURBANCE, PSYCHOTIC DISTURBANCE, MOOD DISTURBANCE, OR ANXIETY, UNSPECIFIED DEMENTIA SEVERITY, UNSPECIFIED DEMENTIA TYPE (HCC): Primary | ICD-10-CM

## 2022-11-28 NOTE — TELEPHONE ENCOUNTER
Need an order for Speech Therapy Evaluation thru [address] It is for memory more so than speech. . Please call Electa Boxer.

## 2022-12-01 ENCOUNTER — OFFICE VISIT (OUTPATIENT)
Dept: FAMILY MEDICINE CLINIC | Age: 87
End: 2022-12-01
Payer: MEDICARE

## 2022-12-01 VITALS
WEIGHT: 156 LBS | RESPIRATION RATE: 14 BRPM | DIASTOLIC BLOOD PRESSURE: 68 MMHG | SYSTOLIC BLOOD PRESSURE: 147 MMHG | BODY MASS INDEX: 30.63 KG/M2 | HEART RATE: 70 BPM | TEMPERATURE: 97.4 F | OXYGEN SATURATION: 97 % | HEIGHT: 60 IN

## 2022-12-01 DIAGNOSIS — I10 BENIGN HYPERTENSION: ICD-10-CM

## 2022-12-01 DIAGNOSIS — Z23 ENCOUNTER FOR IMMUNIZATION: ICD-10-CM

## 2022-12-01 DIAGNOSIS — Z00.01 ENCOUNTER FOR GENERAL ADULT MEDICAL EXAMINATION WITH ABNORMAL FINDINGS: Primary | ICD-10-CM

## 2022-12-01 PROCEDURE — G0439 PPPS, SUBSEQ VISIT: HCPCS | Performed by: FAMILY MEDICINE

## 2022-12-01 PROCEDURE — 1123F ACP DISCUSS/DSCN MKR DOCD: CPT | Performed by: FAMILY MEDICINE

## 2022-12-01 RX ORDER — OLMESARTAN MEDOXOMIL 5 MG/1
5 TABLET ORAL DAILY
Qty: 30 TABLET | Refills: 5 | Status: SHIPPED | OUTPATIENT
Start: 2022-12-01

## 2022-12-01 NOTE — PROGRESS NOTES
[facility]    History of Present Illness:   Viet Ya is a 80 y.o. female here for   Chief Complaint   Patient presents with    Medication Evaluation         HPI:      Health Maintenance  Health Maintenance Due   Topic Date Due    Pneumococcal 65+ years (1 - PCV) Never done    Foot Exam Q1  02/11/2016    Eye Exam Retinal or Dilated  06/30/2016    Medicare Yearly Exam  10/20/2022       Past Medical, Family, and Social History:     Past Medical History:   Diagnosis Date    Allergic rhinitis due to pollen     Anxiety state, unspecified     Backache, unspecified     Basal cell carcinoma     eyelid    Chronic maxillary sinusitis     Depression     Diabetes ([facility] [address]     Diabetes mellitus without mention of complication     Dizziness and giddiness     Unspecified disorder of ankle and foot joint     Unspecified essential hypertension     Urinary tract infection, site not specified       Past Surgical History:   Procedure Laterality Date    HX GYN      HX PACEMAKER N/A     WA BREAST SURGERY PROCEDURE UNLISTED         Current Outpatient Medications on File Prior to Visit   Medication Sig Dispense Refill    albuterol-ipratropium (DUO-NEB) 2.5 mg-0.5 mg/3 ml nebu 3 mL by Nebulization route every six (6) hours as needed for Wheezing. 1 Each 5    gabapentin (NEURONTIN) 100 mg capsule Take 200 mg by mouth Every morning. 200 in AM      gabapentin (NEURONTIN) 300 mg capsule Take 300 mg by mouth nightly. melatonin 5 mg cap capsule Take 5 mg by mouth nightly. 2 tabs      metoprolol tartrate (LOPRESSOR) 50 mg tablet Take  by mouth two (2) times a day. 1 1/2 tab bid      nystatin-triamcinolone (MYCOLOG II) topical cream Apply  to affected area two (2) times a day. nystatin (MYCOSTATIN) powder Apply  to affected area four (4) times daily. sertraline (ZOLOFT) 100 mg tablet Take  by mouth daily. simvastatin (ZOCOR) 20 mg tablet Take  by mouth nightly.       cholecalciferol (VITAMIN D3) 25 mcg (1,000 unit) cap Take  by mouth daily. ezetimibe (ZETIA) 10 mg tablet Take  by mouth. donepeziL (ARICEPT) 5 mg tablet Take  by mouth nightly. apixaban (ELIQUIS) 5 mg tablet Take 5 mg by mouth two (2) times a day. docusate sodium (COLACE) 100 mg capsule Take 100 mg by mouth two (2) times a day. omeprazole (PRILOSEC) 20 mg capsule Take 2 Capsules by mouth daily. (Patient taking differently: Take 20 mg by mouth daily.) 30 Capsule 2    tiotropium (SPIRIVA) 18 mcg inhalation capsule Take 1 Capsule by inhalation daily. 30 Capsule 5    Bifidobacterium Infantis 4 mg cap Take  by mouth. [DISCONTINUED] cephALEXin (KEFLEX) 500 mg capsule TAKE 2 CAPSULES BY MOUTH TWICE DAILY FOR 7 DAYS (Patient not taking: Reported on 12/1/2022)      [DISCONTINUED] azelastine (ASTELIN) 137 mcg (0.1 %) nasal spray USE 1 SPRAY NASALLY EVERY DAY (Patient not taking: Reported on 11/10/2022)      [DISCONTINUED] [address] [place] [address] [id number] mg-mg-million tab Take  by mouth. (Patient not taking: Reported on 12/1/2022)      Insulin Needles, Disposable, (PEN NEEDLE) 31 gauge x 5/16\" ndle E11.22 Diabetes type 2 with kidney complications Inject daily 1 Package 0    [DISCONTINUED] latanoprost (XALATAN) 0.005 % ophthalmic solution  (Patient not taking: Reported on 12/1/2022)       No current facility-administered medications on file prior to visit. Patient Active Problem List   Diagnosis Code    Anxiety state, unspecified F41.1    Allergic rhinitis due to pollen J30.1    Depression F32. 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    Essential hypertension I10    Type 2 diabetes mellitus with stage 3 chronic kidney disease, with long-term current use of insulin (HCC) E11.22, N18.30, Z79.4    Atrial fibrillation (HCC) I48.91    Other specified anemias D64.89    Dementia ([address] F03.90    Hyperlipidemia E78.5 Neuropathy G62.9    Sleep apnea G47.30    Glaucoma H40.9    Chronic obstructive pulmonary disease (HCC) J44.9       Social History     Socioeconomic History    Marital status:    Tobacco Use    Smoking status: Former     Packs/day: 0.50     Types: Cigarettes     Quit date: 1975     Years since quittin.9    Smokeless tobacco: Never    Tobacco comments:     quit over twenty years ago.    Substance and Sexual Activity    Alcohol use: No     Comment: wine at times    Drug use: No    Sexual activity: Never     Social Determinants of Health     Financial Resource Strain: Low Risk     Difficulty of Paying Living Expenses: Not hard at all   Food Insecurity: No Food Insecurity    Worried About Running Out of Food in the Last Year: Never true    Ran Out of Food in the Last Year: Never true        Review of Systems   ROS    Objective:   Visit Vitals  BP (!) 153/73 (BP 1 Location: Left upper arm, BP Patient Position: Sitting, BP Cuff Size: Adult)   Pulse 70   Temp 97.4 °F (36.3 °C) (Oral)   Resp 14   Ht 5' (1.524 m)   Wt 156 lb (70.8 kg)   LMP 1987   SpO2 97%   BMI 30.47 kg/m²        Physical Exam  PHYSICAL EXAM:  Gen: Pt sitting in chair, in NAD  Head: Normocephalic, atraumatic  Eyes: Sclera anicteric, EOM grossly intact,  Ears: TM's pearly with good light reflex b/l  Throat: MMM, normal lips, tongue, teeth and gums  Neck: Supple, no LAD, no thyromegaly or carotid bruits  CVS: Normal S1, S2, no m/r/g  Resp: CTAB, no wheezes or rales  Abd: Soft, non-tender, non-distended, +BS  Extrem: Atraumatic, no cyanosis or edema  Pulses: 2+   Skin: Warm, dry  Neuro: Alert, oriented, appropriate    Pertinent Labs/Studies:  3 most recent PHQ Screens 10/20/2022   PHQ Not Done -   Little interest or pleasure in doing things More than half the days   Feeling down, depressed, irritable, or hopeless Nearly every day   Total Score PHQ 2 5   Trouble falling or staying asleep, or sleeping too much Nearly every day   Feeling tired or having little energy Nearly every day   Poor appetite, weight loss, or overeating Several days   Feeling bad about yourself - or that you are a failure or have let yourself or your family down Nearly every day   Trouble concentrating on things such as school, work, reading, or watching TV Several days   Moving or speaking so slowly that other people could have noticed; or the opposite being so fidgety that others notice Several days   Thoughts of being better off dead, or hurting yourself in some way Not at all   PHQ 9 Score 17      YASIR 2/7 10/20/2022   Feeling nervous, anxious, or on edge 2   Not being able to stop or control worrying 1   Worrying too much about different things 3   Trouble relaxing 3   Being so restless that it is hard to sit still 0   Becoming easily annoyed or irritable 1   Feeling afraid as if something awful might happen 0   YASIR-7 Total Score 10          Assessment and orders:   {ASSESSMENT/PLAN:41628}      I have discussed the diagnosis with the patient and the intended plan as seen in the above orders. Social history, medical history, and labs were reviewed. The patient has received an after-visit summary and questions were answered concerning future plans. I have discussed medication side effects and warnings with the patient as well. Patient/guardian verbalized understanding and accepts plan & risks. Please note that this dictation was completed with [employer], the computer voice recognition software. Quite often unanticipated grammatical, syntax, homophones, and other interpretive errors are inadvertently transcribed by the computer software. Please disregard these errors. Please excuse any errors that have escaped final proofreading. Thank you.      Lennox Justin, MD [facility]  12/01/22

## 2022-12-01 NOTE — PATIENT INSTRUCTIONS
DASH Diet: Care Instructions  Your Care Instructions     The DASH diet is an eating plan that can help lower your blood pressure. DASH stands for Dietary Approaches to Stop Hypertension. Hypertension is high blood pressure. The DASH diet focuses on eating foods that are high in calcium, potassium, and magnesium. These nutrients can lower blood pressure. The foods that are highest in these nutrients are fruits, vegetables, low-fat dairy products, nuts, seeds, and legumes. But taking calcium, potassium, and magnesium supplements instead of eating foods that are high in those nutrients does not have the same effect. The DASH diet also includes whole grains, fish, and poultry. The DASH diet is one of several lifestyle changes your doctor may recommend to lower your high blood pressure. Your doctor may also want you to decrease the amount of sodium in your diet. Lowering sodium while following the DASH diet can lower blood pressure even further than just the DASH diet alone. Follow-up care is a key part of your treatment and safety. Be sure to make and go to all appointments, and call your doctor if you are having problems. It's also a good idea to know your test results and keep a list of the medicines you take. How can you care for yourself at home? Following the DASH diet  Eat 4 to 5 servings of fruit each day. A serving is 1 medium-sized piece of fruit, ½ cup chopped or canned fruit, 1/4 cup dried fruit, or 4 ounces (½ cup) of fruit juice. Choose fruit more often than fruit juice. Eat 4 to 5 servings of vegetables each day. A serving is 1 cup of lettuce or raw leafy vegetables, ½ cup of chopped or cooked vegetables, or 4 ounces (½ cup) of vegetable juice. Choose vegetables more often than vegetable juice. Get 2 to 3 servings of low-fat and fat-free dairy each day. A serving is 8 ounces of milk, 1 cup of yogurt, or 1 ½ ounces of cheese. Eat 6 to 8 servings of grains each day.  A serving is 1 slice of bread, 1 ounce of dry cereal, or ½ cup of cooked rice, pasta, or cooked cereal. Try to choose whole-grain products as much as possible. Limit lean meat, poultry, and fish to 2 servings each day. A serving is 3 ounces, about the size of a deck of cards. Eat 4 to 5 servings of nuts, seeds, and legumes (cooked dried beans, lentils, and split peas) each week. A serving is 1/3 cup of nuts, 2 tablespoons of seeds, or ½ cup of cooked beans or peas. Limit fats and oils to 2 to 3 servings each day. A serving is 1 teaspoon of vegetable oil or 2 tablespoons of salad dressing. Limit sweets and added sugars to 5 servings or less a week. A serving is 1 tablespoon jelly or jam, ½ cup sorbet, or 1 cup of lemonade. Eat less than 2,300 milligrams (mg) of sodium a day. If you limit your sodium to 1,500 mg a day, you can lower your blood pressure even more. Be aware that all of these are the suggested number of servings for people who eat 1,800 to 2,000 calories a day. Your recommended number of servings may be different if you need more or fewer calories. Tips for success  Start small. Do not try to make dramatic changes to your diet all at once. You might feel that you are missing out on your favorite foods and then be more likely to not follow the plan. Make small changes, and stick with them. Once those changes become habit, add a few more changes. Try some of the following:  Make it a goal to eat a fruit or vegetable at every meal and at snacks. This will make it easy to get the recommended amount of fruits and vegetables each day. Try yogurt topped with fruit and nuts for a snack or healthy dessert. Add lettuce, tomato, cucumber, and onion to sandwiches. Combine a ready-made pizza crust with low-fat mozzarella cheese and lots of vegetable toppings. Try using tomatoes, squash, spinach, broccoli, carrots, cauliflower, and onions.   Have a variety of cut-up vegetables with a low-fat dip as an appetizer instead of chips and dip.  Sprinkle sunflower seeds or chopped almonds over salads. Or try adding chopped walnuts or almonds to cooked vegetables. Try some vegetarian meals using beans and peas. Add garbanzo or kidney beans to salads. Make burritos and tacos with mashed calix beans or black beans. Where can you learn more? Go to http://www.guerrero.com/  Enter H967 in the search box to learn more about \"DASH Diet: Care Instructions. \"  Current as of: January 10, 2022               Content Version: 13.4  © 8892-0167 [employer]. Care instructions adapted under license by [employer] (which disclaims liability or warranty for this information). If you have questions about a medical condition or this instruction, always ask your healthcare professional. [address] any warranty or liability for your use of this information.

## 2022-12-01 NOTE — PROGRESS NOTES
1. \"Have you been to the ER, urgent care clinic since your last visit? Hospitalized since your last visit? \" No    2. \"Have you seen or consulted any other health care providers outside of the [address] [place] [address] since your last visit? \" No     3. For patients aged 39-70: Has the patient had a colonoscopy / FIT/ Cologuard? NA - based on age      If the patient is female:    4. For patients aged 41-77: Has the patient had a mammogram within the past 2 years? NA - based on age or sex      11. For patients aged 21-65: Has the patient had a pap smear?  NA - based on age or sex    Health Maintenance Due   Topic Date Due    Pneumococcal 65+ years (1 - PCV) Never done    Foot Exam Q1  02/11/2016    Eye Exam Retinal or Dilated  06/30/2016    Medicare Yearly Exam  10/20/2022

## 2022-12-21 ENCOUNTER — TELEPHONE (OUTPATIENT)
Dept: FAMILY MEDICINE CLINIC | Age: 87
End: 2022-12-21

## 2022-12-29 ENCOUNTER — TELEPHONE (OUTPATIENT)
Dept: FAMILY MEDICINE CLINIC | Age: 87
End: 2022-12-29

## 2023-01-18 ENCOUNTER — OFFICE VISIT (OUTPATIENT)
Dept: FAMILY MEDICINE CLINIC | Age: 88
End: 2023-01-18

## 2023-01-18 VITALS
WEIGHT: 151 LBS | OXYGEN SATURATION: 97 % | HEIGHT: 60 IN | DIASTOLIC BLOOD PRESSURE: 59 MMHG | SYSTOLIC BLOOD PRESSURE: 140 MMHG | HEART RATE: 71 BPM | RESPIRATION RATE: 20 BRPM | BODY MASS INDEX: 29.64 KG/M2 | TEMPERATURE: 97.9 F

## 2023-01-18 DIAGNOSIS — J44.9 CHRONIC OBSTRUCTIVE PULMONARY DISEASE, UNSPECIFIED COPD TYPE (HCC): ICD-10-CM

## 2023-01-18 DIAGNOSIS — E11.22 TYPE 2 DIABETES MELLITUS WITH STAGE 4 CHRONIC KIDNEY DISEASE, WITHOUT LONG-TERM CURRENT USE OF INSULIN (HCC): Primary | ICD-10-CM

## 2023-01-18 DIAGNOSIS — I12.9 HYPERTENSIVE KIDNEY DISEASE: ICD-10-CM

## 2023-01-18 DIAGNOSIS — N18.4 TYPE 2 DIABETES MELLITUS WITH STAGE 4 CHRONIC KIDNEY DISEASE, WITHOUT LONG-TERM CURRENT USE OF INSULIN (HCC): Primary | ICD-10-CM

## 2023-01-18 DIAGNOSIS — K21.9 GASTROESOPHAGEAL REFLUX DISEASE WITHOUT ESOPHAGITIS: ICD-10-CM

## 2023-01-18 DIAGNOSIS — F03.90 DEMENTIA WITHOUT BEHAVIORAL DISTURBANCE, PSYCHOTIC DISTURBANCE, MOOD DISTURBANCE, OR ANXIETY, UNSPECIFIED DEMENTIA SEVERITY, UNSPECIFIED DEMENTIA TYPE (HCC): ICD-10-CM

## 2023-01-18 DIAGNOSIS — I48.0 PAROXYSMAL ATRIAL FIBRILLATION (HCC): ICD-10-CM

## 2023-01-18 PROBLEM — N18.30 TYPE 2 DIABETES MELLITUS WITH STAGE 3 CHRONIC KIDNEY DISEASE, WITH LONG-TERM CURRENT USE OF INSULIN (HCC): Status: RESOLVED | Noted: 2017-10-05 | Resolved: 2023-01-18

## 2023-01-18 PROBLEM — Z79.4 TYPE 2 DIABETES MELLITUS WITH STAGE 3 CHRONIC KIDNEY DISEASE, WITH LONG-TERM CURRENT USE OF INSULIN (HCC): Status: RESOLVED | Noted: 2017-10-05 | Resolved: 2023-01-18

## 2023-01-18 PROBLEM — I10 BENIGN HYPERTENSION: Status: ACTIVE | Noted: 2017-10-04

## 2023-01-18 LAB — HBA1C MFR BLD HPLC: 7.4 %

## 2023-01-18 PROCEDURE — 3051F HG A1C>EQUAL 7.0%<8.0%: CPT | Performed by: FAMILY MEDICINE

## 2023-01-18 PROCEDURE — 99214 OFFICE O/P EST MOD 30 MIN: CPT | Performed by: FAMILY MEDICINE

## 2023-01-18 PROCEDURE — 1123F ACP DISCUSS/DSCN MKR DOCD: CPT | Performed by: FAMILY MEDICINE

## 2023-01-18 PROCEDURE — 83036 HEMOGLOBIN GLYCOSYLATED A1C: CPT | Performed by: FAMILY MEDICINE

## 2023-01-18 RX ORDER — OMEPRAZOLE 20 MG/1
20 CAPSULE, DELAYED RELEASE ORAL DAILY
Qty: 30 CAPSULE | Refills: 2 | Status: SHIPPED | OUTPATIENT
Start: 2023-01-18

## 2023-01-18 RX ORDER — OLMESARTAN MEDOXOMIL 20 MG/1
10 TABLET ORAL DAILY
Qty: 30 TABLET | Refills: 5 | Status: SHIPPED | OUTPATIENT
Start: 2023-01-18

## 2023-01-18 RX ORDER — LATANOPROST 50 UG/ML
SOLUTION/ DROPS OPHTHALMIC
COMMUNITY
Start: 2022-12-26

## 2023-01-18 NOTE — PROGRESS NOTES
[facility]    History of Present Illness:   Nahomy Madrid is a 80 y.o. female here for   Chief Complaint   Patient presents with    Diabetes           HPI:  Here for follow-up diabetes. Recently stopped Trulicity due to R1D is being below goal.  Not on any medications now for diabetes. She watches what she eats. Lab Results   Component Value Date/Time    Hemoglobin A1c 6.2 (H) 10/20/2022 11:35 AM    Hemoglobin A1c (POC) 7.4 2023 01:12 PM       She continues to complain of chronic L shoulder pain, wrist pain, wearing brace. Takes it off at night. She does not sleep well. She misses her . He  10 years ago. She enjoys art. Seeing neuro--it appears that he recently added Aricept. Thinking about moving to the [facility]. Previously followed by pulmonary for COPD and asthma. Last ov she started back on olmesartan but at lower dose of 5 mg.     Lab Results   Component Value Date/Time    Sodium 142 10/20/2022 11:35 AM    Potassium 5.2 (H) 10/20/2022 11:35 AM    Chloride 110 (H) 10/20/2022 11:35 AM    CO2 24 10/20/2022 11:35 AM    Anion gap 8 10/20/2022 11:35 AM    Glucose 85 10/20/2022 11:35 AM    BUN 53 (H) 10/20/2022 11:35 AM    Creatinine 1.77 (H) 10/20/2022 11:35 AM    BUN/Creatinine ratio 30 (H) 10/20/2022 11:35 AM    GFR est AA 43 (L) 2015 12:04 PM    GFR est non-AA 37 (L) 2015 12:04 PM    Calcium 9.0 10/20/2022 11:35 AM       Health Maintenance  Health Maintenance Due   Topic Date Due    Pneumococcal 65+ years (1 - PCV) Never done       Past Medical, Family, and Social History:     Past Medical History:   Diagnosis Date    Allergic rhinitis due to pollen     Anxiety state, unspecified     Backache, unspecified     Basal cell carcinoma     eyelid    Chronic maxillary sinusitis     Depression     Diabetes ([address]     Diabetes mellitus without mention of complication     Dizziness and giddiness     Unspecified disorder of ankle and foot joint Unspecified essential hypertension     Urinary tract infection, site not specified       Past Surgical History:   Procedure Laterality Date    HX GYN      HX PACEMAKER N/A     MS UNLISTED PROCEDURE BREAST         Current Outpatient Medications on File Prior to Visit   Medication Sig Dispense Refill    albuterol-ipratropium (DUO-NEB) 2.5 mg-0.5 mg/3 ml nebu 3 mL by Nebulization route every six (6) hours as needed for Wheezing. 1 Each 5    gabapentin (NEURONTIN) 100 mg capsule Take 200 mg by mouth Every morning. 200 in AM      gabapentin (NEURONTIN) 300 mg capsule Take 300 mg by mouth nightly. melatonin 5 mg cap capsule Take 5 mg by mouth nightly. 2 tabs      metoprolol tartrate (LOPRESSOR) 50 mg tablet Take  by mouth two (2) times a day. 1 1/2 tab bid      nystatin-triamcinolone (MYCOLOG II) topical cream Apply  to affected area two (2) times a day. nystatin (MYCOSTATIN) powder Apply  to affected area four (4) times daily. sertraline (ZOLOFT) 100 mg tablet Take  by mouth daily. simvastatin (ZOCOR) 20 mg tablet Take  by mouth nightly. cholecalciferol (VITAMIN D3) 25 mcg (1,000 unit) cap Take  by mouth daily. ezetimibe (ZETIA) 10 mg tablet Take  by mouth. donepeziL (ARICEPT) 5 mg tablet Take  by mouth nightly. apixaban (ELIQUIS) 5 mg tablet Take 5 mg by mouth two (2) times a day. docusate sodium (COLACE) 100 mg capsule Take 100 mg by mouth two (2) times a day. tiotropium (SPIRIVA) 18 mcg inhalation capsule Take 1 Capsule by inhalation daily. 30 Capsule 5    Bifidobacterium Infantis 4 mg cap Take  by mouth. Insulin Needles, Disposable, (PEN NEEDLE) 31 gauge x 5/16\" ndle E11.22 Diabetes type 2 with kidney complications Inject daily 1 Package 0    latanoprost (XALATAN) 0.005 % ophthalmic solution INSTILL 1 DROP INTO EACH EYE AT BEDTIME      [DISCONTINUED] olmesartan (BENICAR) 5 mg tablet Take 1 Tablet by mouth daily.  30 Tablet 5    [DISCONTINUED] omeprazole (PRILOSEC) 20 mg capsule Take 2 Capsules by mouth daily. (Patient taking differently: Take 20 mg by mouth daily.) 30 Capsule 2     No current facility-administered medications on file prior to visit. Patient Active Problem List   Diagnosis Code    Anxiety state, unspecified F41.1    Allergic rhinitis due to pollen J30.1    Depression F32. A    Obesity E66.9    Seborrheic keratosis L82.1    Type 2 diabetes mellitus with diabetic chronic kidney disease (HCC) E11.22    Benign hypertension I10    Atrial fibrillation (HCC) I48.91    Other specified anemias D64.89    Dementia (HCC) F03.90    Hyperlipidemia E78.5    Neuropathy G62.9    Sleep apnea G47.30    Glaucoma H40.9    Chronic obstructive pulmonary disease (HCC) J44.9       Social History     Socioeconomic History    Marital status:    Tobacco Use    Smoking status: Former     Packs/day: 0.50     Types: Cigarettes     Quit date: 1975     Years since quittin.0    Smokeless tobacco: Never    Tobacco comments:     quit over twenty years ago. Substance and Sexual Activity    Alcohol use: No     Comment: wine at times    Drug use: No    Sexual activity: Never     Social Determinants of Health     Financial Resource Strain: Low Risk     Difficulty of Paying Living Expenses: Not hard at all   Food Insecurity: No Food Insecurity    Worried About Running Out of Food in the Last Year: Never true    Ran Out of Food in the Last Year: Never true        Review of Systems   Review of Systems   Constitutional:  Negative for chills and fever. Respiratory:  Negative for cough, shortness of breath and wheezing. Cardiovascular:  Negative for chest pain. Musculoskeletal:  Positive for joint pain. Psychiatric/Behavioral:  Positive for memory loss.       Objective:   Visit Vitals  BP (!) 140/59   Pulse 71   Temp 97.9 °F (36.6 °C)   Resp 20   Ht 5' (1.524 m)   Wt 151 lb (68.5 kg)   LMP 1987   SpO2 97%   BMI 29.49 kg/m²        Physical Exam  PHYSICAL EXAM:  Gen: Pt sitting in chair, in NAD  Head: Normocephalic, atraumatic  Eyes: Sclera anicteric, EOM grossly intact,  CVS: Normal S1, S2, no m/r/g  Resp: CTAB, no wheezes or rales  Extrem: Atraumatic, no cyanosis or edema  Pulses: 2+   Skin: Warm, dry  Neuro: Alert, oriented, appropriate    Pertinent Labs/Studies:   Testing preformed onsite at today's visit:  Results for orders placed or performed in visit on 01/18/23   AMB POC HEMOGLOBIN A1C   Result Value Ref Range    Hemoglobin A1c (POC) 7.4 %            Assessment and orders:       ICD-10-CM ICD-9-CM    1. Type 2 diabetes mellitus with stage 4 chronic kidney disease, without long-term current use of insulin ([facility])  E11.22 250.40 AMB POC HEMOGLOBIN A1C    N18.4 585.4       2. Dementia without behavioral disturbance, psychotic disturbance, mood disturbance, or anxiety, unspecified dementia severity, unspecified dementia type ([facility]  F03.90 294.20       3. Paroxysmal atrial fibrillation ([facility])  I48.0 427.31       4. Chronic obstructive pulmonary disease, unspecified COPD type ([facility]  J44.9 496       5. Hypertensive kidney disease  I12.9 403.90 olmesartan (BENICAR) 20 mg tablet     585.9       6. Gastroesophageal reflux disease without esophagitis  K21.9 530.81 omeprazole (PRILOSEC) 20 mg capsule        Diagnoses and all orders for this visit:    1. Type 2 diabetes mellitus with stage 4 chronic kidney disease, without long-term current use of insulin ([facility]  Assessment & Plan:   well controlled, diet controlled  Orders:  -     AMB POC HEMOGLOBIN A1C    2. Dementia without behavioral disturbance, psychotic disturbance, mood disturbance, or anxiety, unspecified dementia severity, unspecified dementia type ([facility]  Assessment & Plan:   monitored by specialist. No acute findings meriting change in the plan    3. Paroxysmal atrial fibrillation (HCC)  Assessment & Plan:   well controlled, continue current medications      4.  Chronic obstructive pulmonary disease, unspecified COPD type [facility])  Assessment & Plan:   well controlled, continue current medications  Asymptomatic on spirivia    5. Hypertensive kidney disease  -     olmesartan (BENICAR) 20 mg tablet; Take 0.5 Tablets by mouth daily. Note increased dose  Indications: high blood pressure  Increase olmesartan from 5 mg to 10 mg    6. Gastroesophageal reflux disease without esophagitis  -     omeprazole (PRILOSEC) 20 mg capsule; Take 1 Capsule by mouth daily. Other orders  -     pneumococcal 20-fabian conj-dip, PF, (PREVNAR 20) 0.5 mL syrg injection; 0.5 mL by IntraMUSCular route once for 1 dose. Follow-up and Dispositions    Return in about 3 months (around 4/18/2023) for controlled substance. the following changes in treatment are made: increase olmesartan    lab results and schedule of future lab studies reviewed with patient  reviewed medications and side effects in detail  specific diabetic recommendations: glycohemoglobin and other lab monitoring discussed    Spent 34 min with patient, reviewing chart and face to face exam, clinical documentation. I have discussed the diagnosis with the patient and the intended plan as seen in the above orders. Social history, medical history, and labs were reviewed. The patient has received an after-visit summary and questions were answered concerning future plans. I have discussed medication side effects and warnings with the patient as well. Patient/guardian verbalized understanding and accepts plan & risks. Please note that this dictation was completed with [employer] [place], the computer voice recognition software. Quite often unanticipated grammatical, syntax, homophones, and other interpretive errors are inadvertently transcribed by the computer software. Please disregard these errors. Please excuse any errors that have escaped final proofreading. Thank you.      MD JOVON Meza & JENNY SPRINGER [facility]  01/18/23

## 2023-01-18 NOTE — PATIENT INSTRUCTIONS
Labs today! Get prevnar 20 at local pharmacy. Bring ACP documents with you next office visit. A1C 7.4 - near goal. Follow diabetic diet. DASH Diet: Care Instructions  Your Care Instructions     The DASH diet is an eating plan that can help lower your blood pressure. DASH stands for Dietary Approaches to Stop Hypertension. Hypertension is high blood pressure. The DASH diet focuses on eating foods that are high in calcium, potassium, and magnesium. These nutrients can lower blood pressure. The foods that are highest in these nutrients are fruits, vegetables, low-fat dairy products, nuts, seeds, and legumes. But taking calcium, potassium, and magnesium supplements instead of eating foods that are high in those nutrients does not have the same effect. The DASH diet also includes whole grains, fish, and poultry. The DASH diet is one of several lifestyle changes your doctor may recommend to lower your high blood pressure. Your doctor may also want you to decrease the amount of sodium in your diet. Lowering sodium while following the DASH diet can lower blood pressure even further than just the DASH diet alone. Follow-up care is a key part of your treatment and safety. Be sure to make and go to all appointments, and call your doctor if you are having problems. It's also a good idea to know your test results and keep a list of the medicines you take. How can you care for yourself at home? Following the DASH diet  Eat 4 to 5 servings of fruit each day. A serving is 1 medium-sized piece of fruit, ½ cup chopped or canned fruit, 1/4 cup dried fruit, or 4 ounces (½ cup) of fruit juice. Choose fruit more often than fruit juice. Eat 4 to 5 servings of vegetables each day. A serving is 1 cup of lettuce or raw leafy vegetables, ½ cup of chopped or cooked vegetables, or 4 ounces (½ cup) of vegetable juice. Choose vegetables more often than vegetable juice. Get 2 to 3 servings of low-fat and fat-free dairy each day.  A serving is 8 ounces of milk, 1 cup of yogurt, or 1 ½ ounces of cheese. Eat 6 to 8 servings of grains each day. A serving is 1 slice of bread, 1 ounce of dry cereal, or ½ cup of cooked rice, pasta, or cooked cereal. Try to choose whole-grain products as much as possible. Limit lean meat, poultry, and fish to 2 servings each day. A serving is 3 ounces, about the size of a deck of cards. Eat 4 to 5 servings of nuts, seeds, and legumes (cooked dried beans, lentils, and split peas) each week. A serving is 1/3 cup of nuts, 2 tablespoons of seeds, or ½ cup of cooked beans or peas. Limit fats and oils to 2 to 3 servings each day. A serving is 1 teaspoon of vegetable oil or 2 tablespoons of salad dressing. Limit sweets and added sugars to 5 servings or less a week. A serving is 1 tablespoon jelly or jam, ½ cup sorbet, or 1 cup of lemonade. Eat less than 2,300 milligrams (mg) of sodium a day. If you limit your sodium to 1,500 mg a day, you can lower your blood pressure even more. Be aware that all of these are the suggested number of servings for people who eat 1,800 to 2,000 calories a day. Your recommended number of servings may be different if you need more or fewer calories. Tips for success  Start small. Do not try to make dramatic changes to your diet all at once. You might feel that you are missing out on your favorite foods and then be more likely to not follow the plan. Make small changes, and stick with them. Once those changes become habit, add a few more changes. Try some of the following:  Make it a goal to eat a fruit or vegetable at every meal and at snacks. This will make it easy to get the recommended amount of fruits and vegetables each day. Try yogurt topped with fruit and nuts for a snack or healthy dessert. Add lettuce, tomato, cucumber, and onion to sandwiches. Combine a ready-made pizza crust with low-fat mozzarella cheese and lots of vegetable toppings.  Try using tomatoes, squash, spinach, broccoli, carrots, cauliflower, and onions. Have a variety of cut-up vegetables with a low-fat dip as an appetizer instead of chips and dip. Sprinkle sunflower seeds or chopped almonds over salads. Or try adding chopped walnuts or almonds to cooked vegetables. Try some vegetarian meals using beans and peas. Add garbanzo or kidney beans to salads. Make burritos and tacos with mashed calix beans or black beans. Where can you learn more? Go to http://www.guerrero.com/  Enter H967 in the search box to learn more about \"DASH Diet: Care Instructions. \"  Current as of: January 10, 2022               Content Version: 13.4  © 5080-5905 [employer], [employer]. Care instructions adapted under license by [employer] (which disclaims liability or warranty for this information). If you have questions about a medical condition or this instruction, always ask your healthcare professional. [address] any warranty or liability for your use of this information.

## 2023-01-18 NOTE — ASSESSMENT & PLAN NOTE
well controlled, continue current medications  Lab Results   Component Value Date/Time    Hemoglobin A1c 6.2 (H) 10/20/2022 11:35 AM

## 2023-01-24 ENCOUNTER — NURSE TRIAGE (OUTPATIENT)
Dept: OTHER | Facility: CLINIC | Age: 88
End: 2023-01-24

## 2023-01-24 NOTE — TELEPHONE ENCOUNTER
Location of patient: [address] c[place]res from [facility] with [employer]. Subjective: Caller (daughter) states \"rolled left ankle yesterday today cannot bare weight on that ankle\" walked on it most of the day yesterday but by the end of the day she could no longer put weight on that ankle    Current Symptoms: pain    Onset: 1 day ago; sudden, worsening    Associated Symptoms: reduced activity    Pain Severity: unsure/10; pain; constant    Temperature: denies     What has been tried: taped ankle, iced, elevated, ibuprofen    LMP: NA Pregnant: Unknown    Recommended disposition: Go to ED Now    Care advice provided, patient verbalizes understanding; denies any other questions or concerns; instructed to call back for any new or worsening symptoms. Patient/caller agrees to proceed to [facility] Emergency [facility]    Attention Provider: Thank you for allowing me to participate in the care of your patient. The patient was connected to triage in response to information provided to the [facility]. Please do not respond through this encounter as the response is not directed to a shared pool. Reason for Disposition   Can't stand (bear weight) or walk (e.g., 4 steps)    Protocols used:  Ankle and Foot Injury-ADULT-[place]

## 2023-02-06 ENCOUNTER — PATIENT MESSAGE (OUTPATIENT)
Dept: FAMILY MEDICINE CLINIC | Age: 88
End: 2023-02-06

## 2023-02-06 DIAGNOSIS — M25.512 CHRONIC LEFT SHOULDER PAIN: Primary | ICD-10-CM

## 2023-02-06 DIAGNOSIS — G89.29 CHRONIC LEFT SHOULDER PAIN: Primary | ICD-10-CM

## 2023-02-07 RX ORDER — SIMVASTATIN 20 MG/1
20 TABLET, FILM COATED ORAL
Qty: 90 TABLET | Refills: 1 | Status: SHIPPED | OUTPATIENT
Start: 2023-02-07

## 2023-02-07 RX ORDER — SERTRALINE HYDROCHLORIDE 100 MG/1
100 TABLET, FILM COATED ORAL DAILY
Qty: 90 TABLET | Refills: 1 | Status: SHIPPED | OUTPATIENT
Start: 2023-02-07

## 2023-02-14 DIAGNOSIS — M25.512 CHRONIC LEFT SHOULDER PAIN: ICD-10-CM

## 2023-02-14 DIAGNOSIS — G89.29 CHRONIC LEFT SHOULDER PAIN: ICD-10-CM

## 2023-02-14 RX ORDER — GABAPENTIN 300 MG/1
300 CAPSULE ORAL
Qty: 90 CAPSULE | Refills: 0 | Status: SHIPPED | OUTPATIENT
Start: 2023-02-14 | End: 2023-02-14 | Stop reason: SDUPTHER

## 2023-02-14 RX ORDER — GABAPENTIN 300 MG/1
300 CAPSULE ORAL
Qty: 90 CAPSULE | Refills: 0 | Status: SHIPPED | OUTPATIENT
Start: 2023-02-14

## 2023-02-27 ENCOUNTER — OFFICE VISIT (OUTPATIENT)
Dept: FAMILY MEDICINE CLINIC | Age: 88
End: 2023-02-27
Payer: MEDICARE

## 2023-02-27 VITALS
OXYGEN SATURATION: 96 % | RESPIRATION RATE: 18 BRPM | HEART RATE: 66 BPM | BODY MASS INDEX: 30.43 KG/M2 | SYSTOLIC BLOOD PRESSURE: 125 MMHG | DIASTOLIC BLOOD PRESSURE: 61 MMHG | TEMPERATURE: 97.2 F | HEIGHT: 60 IN | WEIGHT: 155 LBS

## 2023-02-27 DIAGNOSIS — I10 BENIGN HYPERTENSION: ICD-10-CM

## 2023-02-27 DIAGNOSIS — E11.22 TYPE 2 DIABETES MELLITUS WITH STAGE 4 CHRONIC KIDNEY DISEASE, WITHOUT LONG-TERM CURRENT USE OF INSULIN (HCC): Primary | ICD-10-CM

## 2023-02-27 DIAGNOSIS — I48.0 PAROXYSMAL ATRIAL FIBRILLATION (HCC): ICD-10-CM

## 2023-02-27 DIAGNOSIS — F03.90 DEMENTIA WITHOUT BEHAVIORAL DISTURBANCE, PSYCHOTIC DISTURBANCE, MOOD DISTURBANCE, OR ANXIETY, UNSPECIFIED DEMENTIA SEVERITY, UNSPECIFIED DEMENTIA TYPE (HCC): ICD-10-CM

## 2023-02-27 DIAGNOSIS — N18.4 TYPE 2 DIABETES MELLITUS WITH STAGE 4 CHRONIC KIDNEY DISEASE, WITHOUT LONG-TERM CURRENT USE OF INSULIN (HCC): Primary | ICD-10-CM

## 2023-02-27 PROCEDURE — 99214 OFFICE O/P EST MOD 30 MIN: CPT | Performed by: FAMILY MEDICINE

## 2023-02-27 PROCEDURE — 1090F PRES/ABSN URINE INCON ASSESS: CPT | Performed by: FAMILY MEDICINE

## 2023-02-27 PROCEDURE — G8536 NO DOC ELDER MAL SCRN: HCPCS | Performed by: FAMILY MEDICINE

## 2023-02-27 PROCEDURE — 1101F PT FALLS ASSESS-DOCD LE1/YR: CPT | Performed by: FAMILY MEDICINE

## 2023-02-27 PROCEDURE — G8417 CALC BMI ABV UP PARAM F/U: HCPCS | Performed by: FAMILY MEDICINE

## 2023-02-27 PROCEDURE — G9717 DOC PT DX DEP/BP F/U NT REQ: HCPCS | Performed by: FAMILY MEDICINE

## 2023-02-27 PROCEDURE — 1123F ACP DISCUSS/DSCN MKR DOCD: CPT | Performed by: FAMILY MEDICINE

## 2023-02-27 PROCEDURE — G8427 DOCREV CUR MEDS BY ELIG CLIN: HCPCS | Performed by: FAMILY MEDICINE

## 2023-02-27 PROCEDURE — 3051F HG A1C>EQUAL 7.0%<8.0%: CPT | Performed by: FAMILY MEDICINE

## 2023-02-27 NOTE — PROGRESS NOTES
[facility]    History of Present Illness:   Gregorio Morris is a 80 y.o. female here for   Chief Complaint   Patient presents with    Documentation         HPI:  Patient here for physical exam for nursing home. She has a form to complete today. She has chronic left shoulder pain and is seeing an orthopedist for that. She has a right lower extremity numbness and decreased range of motion in her right shoulder from rotator cuff tear. Lab Results   Component Value Date/Time    Hemoglobin A1c 6.2 (H) 10/20/2022 11:35 AM    Hemoglobin A1c (POC) 7.4 01/18/2023 01:12 PM         Health Maintenance  Health Maintenance Due   Topic Date Due    Pneumococcal 65+ years (1 - PCV) Never done       Past Medical, Family, and Social History:     Past Medical History:   Diagnosis Date    Allergic rhinitis due to pollen     Anxiety state, unspecified     Backache, unspecified     Basal cell carcinoma     eyelid    Chronic maxillary sinusitis     Depression     Diabetes ([facility]     Diabetes mellitus without mention of complication     Dizziness and giddiness     Unspecified disorder of ankle and foot joint     Unspecified essential hypertension     Urinary tract infection, site not specified       Past Surgical History:   Procedure Laterality Date    HX GYN      HX PACEMAKER N/A     [place] UNLISTED PROCEDURE BREAST         Current Outpatient Medications on File Prior to Visit   Medication Sig Dispense Refill    gabapentin (NEURONTIN) 300 mg capsule Take 1 Capsule by mouth nightly. Max Daily Amount: 300 mg. 90 Capsule 0    sertraline (ZOLOFT) 100 mg tablet Take 1 Tablet by mouth daily. 90 Tablet 1    simvastatin (ZOCOR) 20 mg tablet Take 1 Tablet by mouth nightly. 90 Tablet 1    latanoprost (XALATAN) 0.005 % ophthalmic solution INSTILL 1 DROP INTO EACH EYE AT BEDTIME      olmesartan (BENICAR) 20 mg tablet Take 0.5 Tablets by mouth daily.  Note increased dose  Indications: high blood pressure 30 Tablet 5 omeprazole (PRILOSEC) 20 mg capsule Take 1 Capsule by mouth daily. 30 Capsule 2    albuterol-ipratropium (DUO-NEB) 2.5 mg-0.5 mg/3 ml nebu 3 mL by Nebulization route every six (6) hours as needed for Wheezing. 1 Each 5    gabapentin (NEURONTIN) 100 mg capsule Take 200 mg by mouth Every morning. 200 in AM      melatonin 5 mg cap capsule Take 5 mg by mouth nightly. 2 tabs - as needed      metoprolol tartrate (LOPRESSOR) 50 mg tablet Take 75 mg by mouth two (2) times a day. nystatin-triamcinolone (MYCOLOG II) topical cream Apply  to affected area two (2) times daily as needed. nystatin (MYCOSTATIN) powder Apply  to affected area four (4) times daily. As needed      cholecalciferol (VITAMIN D3) 25 mcg (1,000 unit) cap Take 1,000 Units by mouth daily. ezetimibe (ZETIA) 10 mg tablet Take 10 mg by mouth daily. donepeziL (ARICEPT) 5 mg tablet Take  by mouth nightly. apixaban (ELIQUIS) 5 mg tablet Take 5 mg by mouth two (2) times a day. docusate sodium (COLACE) 100 mg capsule Take 100 mg by mouth two (2) times a day. tiotropium (SPIRIVA) 18 mcg inhalation capsule Take 1 Capsule by inhalation daily. 30 Capsule 5    Bifidobacterium Infantis 4 mg cap Take  by mouth. Insulin Needles, Disposable, (PEN NEEDLE) 31 gauge x 5/16\" ndle E11.22 Diabetes type 2 with kidney complications Inject daily 1 Package 0     No current facility-administered medications on file prior to visit. Patient Active Problem List   Diagnosis Code    Anxiety state, unspecified F41.1    Allergic rhinitis due to pollen J30.1    Depression F32. A    Obesity E66.9    Seborrheic keratosis L82.1    Type 2 diabetes mellitus with diabetic chronic kidney disease (HCC) E11.22    Benign hypertension I10    Atrial fibrillation (HCC) I48.91    Other specified anemias D64.89    Dementia (HCC) F03.90    Hyperlipidemia E78.5    Neuropathy G62.9    Sleep apnea G47.30    Glaucoma H40.9    Chronic obstructive pulmonary disease ([facility] J44.9       Social History     Socioeconomic History    Marital status:    Tobacco Use    Smoking status: Former     Packs/day: 0.50     Types: Cigarettes     Quit date: 1975     Years since quittin.1    Smokeless tobacco: Never    Tobacco comments:     quit over twenty years ago. Substance and Sexual Activity    Alcohol use: No     Comment: wine at times    Drug use: No    Sexual activity: Never     Social Determinants of Health     Financial Resource Strain: Low Risk     Difficulty of Paying Living Expenses: Not hard at all   Food Insecurity: No Food Insecurity    Worried About Running Out of Food in the Last Year: Never true    Ran Out of Food in the Last Year: Never true        Review of Systems   Review of Systems   Constitutional:  Negative for chills and fever. Respiratory:  Negative for cough and shortness of breath. Cardiovascular:  Negative for chest pain. Gastrointestinal:  Negative for abdominal pain. Musculoskeletal:  Positive for joint pain.      Objective:   Visit Vitals  /61 (BP 1 Location: Right arm, BP Patient Position: Sitting, BP Cuff Size: Adult)   Pulse 66   Temp 97.2 °F (36.2 °C) (Oral)   Resp 18   Ht 5' (1.524 m)   Wt 155 lb (70.3 kg)   LMP 1987   SpO2 96%   BMI 30.27 kg/m²        Physical Exam  PHYSICAL EXAM:  Gen: Pt sitting in chair, in NAD  Head: Normocephalic, atraumatic  Eyes: Sclera anicteric, EOM grossly intact,  CVS: Normal S1, S2, no m/r/g  Resp: CTAB, no wheezes or rales  Abd: Soft, non-tender, non-distended, +BS  Neuro: Alert, oriented, appropriate    Pertinent Labs/Studies:  3 most recent PHQ Screens 2023   PHQ Not Done -   Little interest or pleasure in doing things Several days   Feeling down, depressed, irritable, or hopeless Several days   Total Score PHQ 2 2   Trouble falling or staying asleep, or sleeping too much Several days   Feeling tired or having little energy Not at all   Poor appetite, weight loss, or overeating Not at all Feeling bad about yourself - or that you are a failure or have let yourself or your family down Several days   Trouble concentrating on things such as school, work, reading, or watching TV Not at all   Moving or speaking so slowly that other people could have noticed; or the opposite being so fidgety that others notice Several days   Thoughts of being better off dead, or hurting yourself in some way Not at all   PHQ 9 Score 5   How difficult have these problems made it for you to do your work, take care of your home and get along with others Somewhat difficult             Assessment and orders:       ICD-10-CM ICD-9-CM    1. Type 2 diabetes mellitus with stage 4 chronic kidney disease, without long-term current use of insulin (HCC)  E11.22 250.40     N18.4 585.4       2. Paroxysmal atrial fibrillation (HCC)  I48.0 427.31       3. Dementia without behavioral disturbance, psychotic disturbance, mood disturbance, or anxiety, unspecified dementia severity, unspecified dementia type ([facility]  F03.90 294.20       4. Benign hypertension  I10 401.1         Diagnoses and all orders for this visit:    1. Type 2 diabetes mellitus with stage 4 chronic kidney disease, without long-term current use of insulin ([facility]  Assessment & Plan:   well controlled, continue current medications      2. Paroxysmal atrial fibrillation (HCC)  Assessment & Plan:   well controlled, continue current medications      3. Dementia without behavioral disturbance, psychotic disturbance, mood disturbance, or anxiety, unspecified dementia severity, unspecified dementia type ([facility]    4. Benign hypertension  Assessment & Plan:   well controlled, continue current medications      Follow-up and Dispositions    Return in about 3 months (around 5/27/2023) for controlled substance. current treatment plan is effective, no change in therapy  Form completed in office today.   Spent 35 min with patient, reviewing chart and face to face exam, clinical documentation. I have discussed the diagnosis with the patient and the intended plan as seen in the above orders. Social history, medical history, and labs were reviewed. The patient has received an after-visit summary and questions were answered concerning future plans. I have discussed medication side effects and warnings with the patient as well. Patient/guardian verbalized understanding and accepts plan & risks. Please note that this dictation was completed with [employer], the computer voice recognition software. Quite often unanticipated grammatical, syntax, homophones, and other interpretive errors are inadvertently transcribed by the computer software. Please disregard these errors. Please excuse any errors that have escaped final proofreading. Thank you.      MD JOVON Vásquez & JENNY SPRINGER [facility]  02/27/23

## 2023-02-27 NOTE — PROGRESS NOTES
1. \"Have you been to the ER, urgent care clinic since your last visit? Hospitalized since your last visit? \" No    2. \"Have you seen or consulted any other health care providers outside of the [address] [place] [address] since your last visit? \" No     3. For patients aged 39-70: Has the patient had a colonoscopy / FIT/ Cologuard? NA - based on age      If the patient is female:    4. For patients aged 41-77: Has the patient had a mammogram within the past 2 years? NA - based on age or sex      11. For patients aged 21-65: Has the patient had a pap smear?  NA - based on age or sex    Health Maintenance Due   Topic Date Due    Pneumococcal 65+ years (1 - PCV) Never done

## 2023-02-27 NOTE — LETTER
2/27/2023 11:37 AM    Ms. Zi Forrest 59    Current Outpatient Medications   Medication Sig Dispense Refill    gabapentin (NEURONTIN) 300 mg capsule Take 1 Capsule by mouth nightly. Max Daily Amount: 300 mg. 90 Capsule 0    sertraline (ZOLOFT) 100 mg tablet Take 1 Tablet by mouth daily. 90 Tablet 1    simvastatin (ZOCOR) 20 mg tablet Take 1 Tablet by mouth nightly. 90 Tablet 1    latanoprost (XALATAN) 0.005 % ophthalmic solution INSTILL 1 DROP INTO EACH EYE AT BEDTIME      olmesartan (BENICAR) 20 mg tablet Take 0.5 Tablets by mouth daily. Note increased dose  Indications: high blood pressure 30 Tablet 5    omeprazole (PRILOSEC) 20 mg capsule Take 1 Capsule by mouth daily. 30 Capsule 2    albuterol-ipratropium (DUO-NEB) 2.5 mg-0.5 mg/3 ml nebu 3 mL by Nebulization route every six (6) hours as needed for Wheezing. 1 Each 5    gabapentin (NEURONTIN) 100 mg capsule Take 200 mg by mouth Every morning. 200 in AM      melatonin 5 mg cap capsule Take 5 mg by mouth nightly. 2 tabs - as needed      metoprolol tartrate (LOPRESSOR) 50 mg tablet Take 75 mg by mouth two (2) times a day.  nystatin-triamcinolone (MYCOLOG II) topical cream Apply  to affected area two (2) times daily as needed.  nystatin (MYCOSTATIN) powder Apply  to affected area four (4) times daily. As needed      cholecalciferol (VITAMIN D3) 25 mcg (1,000 unit) cap Take 1,000 Units by mouth daily.  ezetimibe (ZETIA) 10 mg tablet Take 10 mg by mouth daily.  donepeziL (ARICEPT) 5 mg tablet Take  by mouth nightly.  apixaban (ELIQUIS) 5 mg tablet Take 5 mg by mouth two (2) times a day.  docusate sodium (COLACE) 100 mg capsule Take 100 mg by mouth two (2) times a day.  tiotropium (SPIRIVA) 18 mcg inhalation capsule Take 1 Capsule by inhalation daily. 30 Capsule 5    Bifidobacterium Infantis 4 mg cap Take  by mouth.       Insulin Needles, Disposable, (PEN NEEDLE) 31 gauge x 5/16\" ndle E11.22 Diabetes type 2 with kidney complications Inject daily 1 Package 0               Sincerely,      Ayde Galaviz MD

## 2023-02-27 NOTE — PROGRESS NOTES
Explained and discussed with patient an Advanced Medical Directive. Provided patient blank Advanced Medical Directive. Reviewed Advanced Medical Directive paperwork with patient with a brief description of how to complete the form. Requested that if document completed, to please provide a copy of completed Advanced Medical Directive to [employer]. Patient verbalized understanding.

## 2023-03-08 ENCOUNTER — TELEPHONE (OUTPATIENT)
Dept: FAMILY MEDICINE CLINIC | Age: 88
End: 2023-03-08

## 2023-03-13 ENCOUNTER — TELEPHONE (OUTPATIENT)
Dept: FAMILY MEDICINE CLINIC | Age: 88
End: 2023-03-13

## 2023-03-13 RX ORDER — DEXTROMETHORPHAN HYDROBROMIDE, GUAIFENESIN 5; 100 MG/5ML; MG/5ML
1300 LIQUID ORAL 2 TIMES DAILY
Qty: 120 TABLET | Refills: 5 | Status: SHIPPED | OUTPATIENT
Start: 2023-03-13

## 2023-03-13 RX ORDER — DEXTROMETHORPHAN HYDROBROMIDE, GUAIFENESIN 5; 100 MG/5ML; MG/5ML
1300 LIQUID ORAL 2 TIMES DAILY
Qty: 120 TABLET | Refills: 5 | Status: SHIPPED | OUTPATIENT
Start: 2023-03-13 | End: 2023-03-13 | Stop reason: SDUPTHER

## 2023-03-30 ENCOUNTER — TELEPHONE (OUTPATIENT)
Dept: FAMILY MEDICINE CLINIC | Age: 88
End: 2023-03-30

## 2023-03-30 DIAGNOSIS — I48.0 PAROXYSMAL ATRIAL FIBRILLATION (HCC): Primary | ICD-10-CM

## 2023-03-30 NOTE — TELEPHONE ENCOUNTER
Pt daughter tried to schedule an appt to see Cardiologist but need an order first. She would like to go to [facility] cardiovascular [facility] in [place].

## 2023-04-18 ENCOUNTER — OFFICE VISIT (OUTPATIENT)
Dept: FAMILY MEDICINE CLINIC | Age: 88
End: 2023-04-18
Payer: MEDICARE

## 2023-04-18 VITALS
SYSTOLIC BLOOD PRESSURE: 133 MMHG | DIASTOLIC BLOOD PRESSURE: 55 MMHG | OXYGEN SATURATION: 94 % | RESPIRATION RATE: 18 BRPM | WEIGHT: 162 LBS | HEART RATE: 69 BPM | HEIGHT: 60 IN | TEMPERATURE: 98.9 F | BODY MASS INDEX: 31.8 KG/M2

## 2023-04-18 DIAGNOSIS — F03.C4 SEVERE DEMENTIA WITH ANXIETY, UNSPECIFIED DEMENTIA TYPE (HCC): ICD-10-CM

## 2023-04-18 DIAGNOSIS — E11.22 TYPE 2 DIABETES MELLITUS WITH STAGE 4 CHRONIC KIDNEY DISEASE, WITH LONG-TERM CURRENT USE OF INSULIN (HCC): ICD-10-CM

## 2023-04-18 DIAGNOSIS — N18.4 TYPE 2 DIABETES MELLITUS WITH STAGE 4 CHRONIC KIDNEY DISEASE, WITH LONG-TERM CURRENT USE OF INSULIN (HCC): ICD-10-CM

## 2023-04-18 DIAGNOSIS — E78.2 MIXED HYPERLIPIDEMIA: ICD-10-CM

## 2023-04-18 DIAGNOSIS — Z79.899 ENCOUNTER FOR LONG-TERM (CURRENT) USE OF OTHER MEDICATIONS: ICD-10-CM

## 2023-04-18 DIAGNOSIS — M54.2 NECK PAIN: Primary | ICD-10-CM

## 2023-04-18 DIAGNOSIS — Z79.4 TYPE 2 DIABETES MELLITUS WITH STAGE 4 CHRONIC KIDNEY DISEASE, WITH LONG-TERM CURRENT USE OF INSULIN (HCC): ICD-10-CM

## 2023-04-18 DIAGNOSIS — M25.512 CHRONIC LEFT SHOULDER PAIN: ICD-10-CM

## 2023-04-18 DIAGNOSIS — G89.29 CHRONIC LEFT SHOULDER PAIN: ICD-10-CM

## 2023-04-18 PROCEDURE — 1123F ACP DISCUSS/DSCN MKR DOCD: CPT | Performed by: FAMILY MEDICINE

## 2023-04-18 PROCEDURE — 1090F PRES/ABSN URINE INCON ASSESS: CPT | Performed by: FAMILY MEDICINE

## 2023-04-18 PROCEDURE — 99214 OFFICE O/P EST MOD 30 MIN: CPT | Performed by: FAMILY MEDICINE

## 2023-04-18 PROCEDURE — G9717 DOC PT DX DEP/BP F/U NT REQ: HCPCS | Performed by: FAMILY MEDICINE

## 2023-04-18 PROCEDURE — 1101F PT FALLS ASSESS-DOCD LE1/YR: CPT | Performed by: FAMILY MEDICINE

## 2023-04-18 PROCEDURE — G8417 CALC BMI ABV UP PARAM F/U: HCPCS | Performed by: FAMILY MEDICINE

## 2023-04-18 PROCEDURE — G8536 NO DOC ELDER MAL SCRN: HCPCS | Performed by: FAMILY MEDICINE

## 2023-04-18 PROCEDURE — G8427 DOCREV CUR MEDS BY ELIG CLIN: HCPCS | Performed by: FAMILY MEDICINE

## 2023-04-18 RX ORDER — DICLOFENAC SODIUM 10 MG/G
4 GEL TOPICAL 4 TIMES DAILY
Qty: 300 G | Refills: 1 | Status: SHIPPED | OUTPATIENT
Start: 2023-04-18

## 2023-04-18 RX ORDER — GABAPENTIN 300 MG/1
300 CAPSULE ORAL
Qty: 90 CAPSULE | Refills: 0 | Status: SHIPPED | OUTPATIENT
Start: 2023-04-18

## 2023-04-18 NOTE — PROGRESS NOTES
1. Have you been to the ER, urgent care clinic since your last visit? Hospitalized since your last visit?  no    2. Have you seen or consulted any other health care providers outside of the [address] [place] [address] since your last visit? Ortho, in chart      3. For patients aged 39-70: Has the patient had a colonoscopy / FIT/ Cologuard? na      If the patient is female:    4. For patients aged 41-77: Has the patient had a mammogram within the past 2 years? na    5. For patients aged 21-65: Has the patient had a pap smear?        Opportunity was given for questions    Goals that were addressed and/or need to be completed during or after this appointment include   Health Maintenance Due   Topic Date Due    Lipid Screen  11/06/2016

## 2023-04-18 NOTE — PROGRESS NOTES
[facility]    History of Present Illness:   Mansoor Martinez is a 80 y.o. female here for   Chief Complaint   Patient presents with    Follow Up Chronic Condition    Fall         HPI:  She has neck pain, chronic left shoulder pain and more recently right shoulder and is seeing an orthopedist for that soon. She has right lower extremity numbness which she takes gabapentin for and decreased range of motion in her right shoulder from rotator cuff tear. Having more nerve pain and daughter questions whether assisting living facility is giving her her bedtime gabapentin. It was last filled in February. She is due for labs today. She has had some falls at the facility but denies any dizziness. She usually trips. She is using a rollator. [facility] Health Maintenance  Health Maintenance Due   Topic Date Due    Lipid Screen  11/06/2016       Past Medical, Family, and Social History:     Past Medical History:   Diagnosis Date    Allergic rhinitis due to pollen     Anxiety state, unspecified     Backache, unspecified     Basal cell carcinoma     eyelid    Chronic maxillary sinusitis     Depression     Diabetes ([facility]     Diabetes mellitus without mention of complication     Dizziness and giddiness     Unspecified disorder of ankle and foot joint     Unspecified essential hypertension     Urinary tract infection, site not specified       Past Surgical History:   Procedure Laterality Date    HX GYN      HX PACEMAKER N/A     [place] UNLISTED PROCEDURE BREAST         Current Outpatient Medications on File Prior to Visit   Medication Sig Dispense Refill    acetaminophen (Tylenol Arthritis Pain) 650 mg TbER Take 2 Tablets by mouth two (2) times a day. Am and pm 120 Tablet 5    gabapentin (NEURONTIN) 300 mg capsule Take 1 Capsule by mouth nightly. Max Daily Amount: 300 mg. 90 Capsule 0    sertraline (ZOLOFT) 100 mg tablet Take 1 Tablet by mouth daily.  90 Tablet 1    simvastatin (ZOCOR) 20 mg tablet Take 1 Tablet by mouth nightly. 90 Tablet 1    latanoprost (XALATAN) 0.005 % ophthalmic solution INSTILL 1 DROP INTO EACH EYE AT BEDTIME      olmesartan (BENICAR) 20 mg tablet Take 0.5 Tablets by mouth daily. Note increased dose  Indications: high blood pressure 30 Tablet 5    omeprazole (PRILOSEC) 20 mg capsule Take 1 Capsule by mouth daily. 30 Capsule 2    albuterol-ipratropium (DUO-NEB) 2.5 mg-0.5 mg/3 ml nebu 3 mL by Nebulization route every six (6) hours as needed for Wheezing. 1 Each 5    gabapentin (NEURONTIN) 100 mg capsule Take 2 Capsules by mouth Every morning. 200 in AM      metoprolol tartrate (LOPRESSOR) 50 mg tablet Take 1.5 Tablets by mouth two (2) times a day. nystatin (MYCOSTATIN) powder Apply  to affected area four (4) times daily. As needed      cholecalciferol (VITAMIN D3) 25 mcg (1,000 unit) cap Take 1 Capsule by mouth daily. ezetimibe (ZETIA) 10 mg tablet Take 1 Tablet by mouth daily. donepeziL (ARICEPT) 5 mg tablet Take  by mouth nightly. apixaban (ELIQUIS) 5 mg tablet Take 1 Tablet by mouth two (2) times a day. docusate sodium (COLACE) 100 mg capsule Take 1 Capsule by mouth two (2) times a day. tiotropium (SPIRIVA) 18 mcg inhalation capsule Take 1 Capsule by inhalation daily. 30 Capsule 5    Insulin Needles, Disposable, (PEN NEEDLE) 31 gauge x 5/16\" ndle E11.22 Diabetes type 2 with kidney complications Inject daily 1 Package 0    melatonin 5 mg cap capsule Take 5 mg by mouth nightly. 2 tabs - as needed (Patient not taking: Reported on 4/18/2023)      nystatin-triamcinolone (MYCOLOG II) topical cream Apply  to affected area two (2) times daily as needed. (Patient not taking: Reported on 4/18/2023)      Bifidobacterium Infantis 4 mg cap Take  by mouth. (Patient not taking: Reported on 4/18/2023)       No current facility-administered medications on file prior to visit.        Patient Active Problem List   Diagnosis Code    Anxiety state, unspecified F41.1 Allergic rhinitis due to pollen J30.1    Depression F32. A    Obesity E66.9    Seborrheic keratosis L82.1    Type 2 diabetes mellitus with diabetic chronic kidney disease (HCC) E11.22    Benign hypertension I10    Atrial fibrillation (HCC) I48.91    Other specified anemias D64.89    Severe dementia with anxiety, unspecified dementia type ([facility] F03. C4    Hyperlipidemia E78.5    Neuropathy G62.9    Sleep apnea G47.30    Glaucoma H40.9    Chronic obstructive pulmonary disease (HCC) J44.9       Social History     Socioeconomic History    Marital status:    Tobacco Use    Smoking status: Former     Packs/day: 0.50     Types: Cigarettes     Quit date: 1975     Years since quittin.3    Smokeless tobacco: Never    Tobacco comments:     quit over twenty years ago. Substance and Sexual Activity    Alcohol use: No     Comment: wine at times    Drug use: No    Sexual activity: Never     Social Determinants of Health     Financial Resource Strain: Low Risk     Difficulty of Paying Living Expenses: Not hard at all   Food Insecurity: No Food Insecurity    Worried About Running Out of Food in the Last Year: Never true    Ran Out of Food in the Last Year: Never true        Review of Systems   Review of Systems   Constitutional:  Negative for chills and fever. Musculoskeletal:  Positive for falls, joint pain and neck pain. Neurological:  Positive for tingling. Negative for dizziness.      Objective:   Visit Vitals  BP (!) 133/55 (BP 1 Location: Right upper arm, BP Patient Position: Sitting)   Pulse 69   Temp 98.9 °F (37.2 °C) (Oral)   Resp 18   Ht 5' (1.524 m)   Wt 162 lb (73.5 kg)   LMP 1987   SpO2 94%   BMI 31.64 kg/m²        Physical Exam  PHYSICAL EXAM:  Gen: Pt sitting in chair, in NAD  Head: Normocephalic, atraumatic  Eyes: Sclera anicteric, EOM grossly intact,  CVS: Normal S1, S2, no m/r/g  Resp: CTAB, no wheezes or rales  Extrem: Atraumatic, no cyanosis or edema, decreased range of motion bilateral shoulders  Neuro: Alert, appropriate    Pertinent Labs/Studies:  3 most recent PHQ Screens 4/18/2023   PHQ Not Done -   Little interest or pleasure in doing things Not at all   Feeling down, depressed, irritable, or hopeless Not at all   Total Score PHQ 2 0   Trouble falling or staying asleep, or sleeping too much -   Feeling tired or having little energy -   Poor appetite, weight loss, or overeating -   Feeling bad about yourself - or that you are a failure or have let yourself or your family down -   Trouble concentrating on things such as school, work, reading, or watching TV -   Moving or speaking so slowly that other people could have noticed; or the opposite being so fidgety that others notice -   Thoughts of being better off dead, or hurting yourself in some way -   PHQ 9 Score -   How difficult have these problems made it for you to do your work, take care of your home and get along with others -             Assessment and orders:       ICD-10-CM ICD-9-CM    1. Neck pain  M54.2 723.1 diclofenac (VOLTAREN) 1 % gel      gabapentin (NEURONTIN) 300 mg capsule      2. Severe dementia with anxiety, unspecified dementia type ([facility]  F03. C4 294.21       3. Chronic left shoulder pain  M25.512 719.41 gabapentin (NEURONTIN) 300 mg capsule    G89.29 338.29       4. Mixed hyperlipidemia  E78.2 272.2 LIPID PANEL      5. Encounter for long-term (current) use of other medications  Z79.899 V58.69 CBC WITH AUTOMATED DIFF      METABOLIC PANEL, COMPREHENSIVE        Diagnoses and all orders for this visit:    1. Neck pain  -     diclofenac (VOLTAREN) 1 % gel; Apply 4 g to affected area four (4) times daily. Neck/Shoulder  -     gabapentin (NEURONTIN) 300 mg capsule; Take 1 Capsule by mouth nightly. Max Daily Amount: 300 mg.    2. Severe dementia with anxiety, unspecified dementia type [facility])  Assessment & Plan:   at goal, continue current medications      3.  Chronic left shoulder pain  -     gabapentin (NEURONTIN) 300 mg capsule; Take 1 Capsule by mouth nightly. Max Daily Amount: 300 mg.    4. Mixed hyperlipidemia  -     LIPID PANEL; Future    5. Encounter for long-term (current) use of other medications  -     CBC WITH AUTOMATED DIFF; Future  -     METABOLIC PANEL, COMPREHENSIVE; Future        the following changes in treatment are made: Trial of diclofenac topical, advised that a small amount may be absorbed systemically. Advised otc tylenol arthritis 2 tabs every 12 hr, topical diclofenac, lidocaine patch 12 hr on and 12 hr off. F/U ASAP for worsening pain or swelling or decreased ROM. She signed medication controlled substance agreement today. lab results and schedule of future lab studies reviewed with patient  reviewed medications and side effects in detail  Advised daughter to discuss medication management with facility. I have discussed the diagnosis with the patient and the intended plan as seen in the above orders. Social history, medical history, and labs were reviewed. The patient has received an after-visit summary and questions were answered concerning future plans. I have discussed medication side effects and warnings with the patient as well. Patient/guardian verbalized understanding and accepts plan & risks. Please note that this dictation was completed with [employer], the [employer] voice recognition software. Quite often unanticipated grammatical, syntax, homophones, and other interpretive errors are inadvertently transcribed by the computer software. Please disregard these errors. Please excuse any errors that have escaped final proofreading. Thank you.      MD JOVON Olsen & JENNY SPRINGER [facility]  04/18/23

## 2023-04-19 LAB
ALBUMIN SERPL-MCNC: 3.6 G/DL (ref 3.5–5)
ALBUMIN/GLOB SERPL: 1.3 (ref 1.1–2.2)
ALP SERPL-CCNC: 127 U/L (ref 45–117)
ALT SERPL-CCNC: 23 U/L (ref 12–78)
ANION GAP SERPL CALC-SCNC: 1 MMOL/L (ref 5–15)
AST SERPL-CCNC: 20 U/L (ref 15–37)
BASOPHILS # BLD: 0 K/UL (ref 0–0.1)
BASOPHILS NFR BLD: 0 % (ref 0–1)
BILIRUB SERPL-MCNC: 0.3 MG/DL (ref 0.2–1)
BUN SERPL-MCNC: 40 MG/DL (ref 6–20)
BUN/CREAT SERPL: 32 (ref 12–20)
CALCIUM SERPL-MCNC: 9.2 MG/DL (ref 8.5–10.1)
CHLORIDE SERPL-SCNC: 109 MMOL/L (ref 97–108)
CHOLEST SERPL-MCNC: 152 MG/DL
CO2 SERPL-SCNC: 30 MMOL/L (ref 21–32)
CREAT SERPL-MCNC: 1.25 MG/DL (ref 0.55–1.02)
DIFFERENTIAL METHOD BLD: ABNORMAL
EOSINOPHIL # BLD: 0.1 K/UL (ref 0–0.4)
EOSINOPHIL NFR BLD: 2 % (ref 0–7)
ERYTHROCYTE [DISTWIDTH] IN BLOOD BY AUTOMATED COUNT: 13.4 % (ref 11.5–14.5)
GLOBULIN SER CALC-MCNC: 2.8 G/DL (ref 2–4)
GLUCOSE SERPL-MCNC: 205 MG/DL (ref 65–100)
HCT VFR BLD AUTO: 41 % (ref 35–47)
HDLC SERPL-MCNC: 65 MG/DL
HDLC SERPL: 2.3 (ref 0–5)
HGB BLD-MCNC: 11.6 G/DL (ref 11.5–16)
IMM GRANULOCYTES # BLD AUTO: 0 K/UL (ref 0–0.04)
IMM GRANULOCYTES NFR BLD AUTO: 0 % (ref 0–0.5)
LDLC SERPL CALC-MCNC: 73 MG/DL (ref 0–100)
LYMPHOCYTES # BLD: 1.5 K/UL (ref 0.8–3.5)
LYMPHOCYTES NFR BLD: 21 % (ref 12–49)
MCH RBC QN AUTO: 28.9 PG (ref 26–34)
MCHC RBC AUTO-ENTMCNC: 28.3 G/DL (ref 30–36.5)
MCV RBC AUTO: 102.2 FL (ref 80–99)
MONOCYTES # BLD: 0.6 K/UL (ref 0–1)
MONOCYTES NFR BLD: 8 % (ref 5–13)
NEUTS SEG # BLD: 4.9 K/UL (ref 1.8–8)
NEUTS SEG NFR BLD: 69 % (ref 32–75)
NRBC # BLD: 0.04 K/UL (ref 0–0.01)
NRBC BLD-RTO: 0.6 PER 100 WBC
PLATELET # BLD AUTO: 237 K/UL (ref 150–400)
PMV BLD AUTO: 10.6 FL (ref 8.9–12.9)
POTASSIUM SERPL-SCNC: 4.8 MMOL/L (ref 3.5–5.1)
PROT SERPL-MCNC: 6.4 G/DL (ref 6.4–8.2)
RBC # BLD AUTO: 4.01 M/UL (ref 3.8–5.2)
RBC MORPH BLD: ABNORMAL
SODIUM SERPL-SCNC: 140 MMOL/L (ref 136–145)
TRIGL SERPL-MCNC: 70 MG/DL (ref ?–150)
VLDLC SERPL CALC-MCNC: 14 MG/DL
WBC # BLD AUTO: 7.1 K/UL (ref 3.6–11)

## 2023-04-20 LAB
EST. AVERAGE GLUCOSE BLD GHB EST-MCNC: 151 MG/DL
HBA1C MFR BLD: 6.9 % (ref 4–5.6)

## 2023-05-23 ENCOUNTER — OFFICE VISIT (OUTPATIENT)
Facility: CLINIC | Age: 88
End: 2023-05-23
Payer: MEDICARE

## 2023-05-23 VITALS
RESPIRATION RATE: 16 BRPM | SYSTOLIC BLOOD PRESSURE: 114 MMHG | TEMPERATURE: 97.5 F | WEIGHT: 156 LBS | BODY MASS INDEX: 30.63 KG/M2 | HEART RATE: 68 BPM | DIASTOLIC BLOOD PRESSURE: 63 MMHG | HEIGHT: 60 IN | OXYGEN SATURATION: 94 %

## 2023-05-23 DIAGNOSIS — Z09 HOSPITAL DISCHARGE FOLLOW-UP: ICD-10-CM

## 2023-05-23 DIAGNOSIS — J44.9 CHRONIC OBSTRUCTIVE PULMONARY DISEASE, UNSPECIFIED COPD TYPE (HCC): Primary | ICD-10-CM

## 2023-05-23 DIAGNOSIS — S60.416D: ICD-10-CM

## 2023-05-23 DIAGNOSIS — Z91.81 AT HIGH RISK FOR FALLS: ICD-10-CM

## 2023-05-23 PROCEDURE — 1111F DSCHRG MED/CURRENT MED MERGE: CPT | Performed by: FAMILY MEDICINE

## 2023-05-23 PROCEDURE — 1123F ACP DISCUSS/DSCN MKR DOCD: CPT | Performed by: FAMILY MEDICINE

## 2023-05-23 PROCEDURE — 99214 OFFICE O/P EST MOD 30 MIN: CPT | Performed by: FAMILY MEDICINE

## 2023-05-23 RX ORDER — IPRATROPIUM/ALBUTEROL SULFATE 20-100 MCG
1 MIST INHALER (GRAM) INHALATION EVERY 6 HOURS PRN
COMMUNITY
Start: 2023-05-01

## 2023-05-23 RX ORDER — GUAIFENESIN 600 MG/1
600 TABLET, EXTENDED RELEASE ORAL
COMMUNITY
Start: 2023-04-25

## 2023-05-23 RX ORDER — FLUTICASONE FUROATE, UMECLIDINIUM BROMIDE AND VILANTEROL TRIFENATATE 200; 62.5; 25 UG/1; UG/1; UG/1
1 POWDER RESPIRATORY (INHALATION) DAILY
COMMUNITY
Start: 2023-05-05

## 2023-05-23 RX ORDER — BENZONATATE 100 MG/1
100 CAPSULE ORAL 3 TIMES DAILY PRN
COMMUNITY
Start: 2023-05-05

## 2023-05-23 SDOH — ECONOMIC STABILITY: FOOD INSECURITY: WITHIN THE PAST 12 MONTHS, YOU WORRIED THAT YOUR FOOD WOULD RUN OUT BEFORE YOU GOT MONEY TO BUY MORE.: NEVER TRUE

## 2023-05-23 SDOH — ECONOMIC STABILITY: FOOD INSECURITY: WITHIN THE PAST 12 MONTHS, THE FOOD YOU BOUGHT JUST DIDN'T LAST AND YOU DIDN'T HAVE MONEY TO GET MORE.: NEVER TRUE

## 2023-05-23 SDOH — ECONOMIC STABILITY: HOUSING INSECURITY
IN THE LAST 12 MONTHS, WAS THERE A TIME WHEN YOU DID NOT HAVE A STEADY PLACE TO SLEEP OR SLEPT IN A SHELTER (INCLUDING NOW)?: NO

## 2023-05-23 SDOH — ECONOMIC STABILITY: INCOME INSECURITY: HOW HARD IS IT FOR YOU TO PAY FOR THE VERY BASICS LIKE FOOD, HOUSING, MEDICAL CARE, AND HEATING?: NOT HARD AT ALL

## 2023-05-23 ASSESSMENT — ENCOUNTER SYMPTOMS
SHORTNESS OF BREATH: 0
COUGH: 0

## 2023-05-23 NOTE — PROGRESS NOTES
were reviewed. The patient has received an after-visit summary and questions were answered concerning future plans. I have discussed medication side effects and warnings with the patient as well. Patient verbalized understanding and accepts plan & risks. Please note that this dictation was completed with [employer], the computer voice recognition software. Quite often unanticipated grammatical, syntax, homophones, and other interpretive errors are inadvertently transcribed by the computer software. Please disregard these errors. Please excuse any errors that have escaped final proofreading. Thank you. MD CALISTA Mitchell & PAM RANDALL [facility]  05/23/23  On the basis of positive falls risk screening, assessment and plan is as follows: home safety tips provided.

## 2023-07-19 ENCOUNTER — OFFICE VISIT (OUTPATIENT)
Facility: CLINIC | Age: 88
End: 2023-07-19
Payer: MEDICARE

## 2023-07-19 VITALS
BODY MASS INDEX: 32.2 KG/M2 | RESPIRATION RATE: 19 BRPM | SYSTOLIC BLOOD PRESSURE: 120 MMHG | WEIGHT: 164 LBS | DIASTOLIC BLOOD PRESSURE: 66 MMHG | OXYGEN SATURATION: 97 % | TEMPERATURE: 97.9 F | HEIGHT: 60 IN | HEART RATE: 80 BPM

## 2023-07-19 DIAGNOSIS — M54.2 CERVICALGIA: ICD-10-CM

## 2023-07-19 DIAGNOSIS — I95.1 ORTHOSTATIC HYPOTENSION: ICD-10-CM

## 2023-07-19 DIAGNOSIS — M25.512 CHRONIC LEFT SHOULDER PAIN: Primary | ICD-10-CM

## 2023-07-19 DIAGNOSIS — I48.0 PAROXYSMAL ATRIAL FIBRILLATION (HCC): ICD-10-CM

## 2023-07-19 DIAGNOSIS — G89.29 CHRONIC LEFT SHOULDER PAIN: Primary | ICD-10-CM

## 2023-07-19 DIAGNOSIS — I10 BENIGN HYPERTENSION: ICD-10-CM

## 2023-07-19 DIAGNOSIS — R29.6 FREQUENT FALLS: ICD-10-CM

## 2023-07-19 PROBLEM — S52.501A DISTAL RADIUS FRACTURE, RIGHT: Status: ACTIVE | Noted: 2023-07-19

## 2023-07-19 PROBLEM — C44.101: Status: ACTIVE | Noted: 2019-01-10

## 2023-07-19 PROBLEM — K21.9 GERD (GASTROESOPHAGEAL REFLUX DISEASE): Status: ACTIVE | Noted: 2023-07-19

## 2023-07-19 PROBLEM — M19.011 OSTEOARTHRITIS OF RIGHT GLENOHUMERAL JOINT: Status: ACTIVE | Noted: 2023-07-19

## 2023-07-19 PROBLEM — H26.9 CATARACTS, BOTH EYES: Status: ACTIVE | Noted: 2020-11-10

## 2023-07-19 PROCEDURE — 99214 OFFICE O/P EST MOD 30 MIN: CPT | Performed by: FAMILY MEDICINE

## 2023-07-19 PROCEDURE — 1123F ACP DISCUSS/DSCN MKR DOCD: CPT | Performed by: FAMILY MEDICINE

## 2023-07-19 RX ORDER — NYSTATIN 100000 [USP'U]/G
POWDER TOPICAL 4 TIMES DAILY
Qty: 1 EACH | Refills: 2 | Status: SHIPPED | OUTPATIENT
Start: 2023-07-19

## 2023-07-19 RX ORDER — LATANOPROST 50 UG/ML
1 SOLUTION/ DROPS OPHTHALMIC DAILY
COMMUNITY
Start: 2023-06-19

## 2023-07-19 RX ORDER — METOPROLOL TARTRATE 50 MG/1
50 TABLET, FILM COATED ORAL 2 TIMES DAILY
Qty: 180 TABLET | Refills: 1 | Status: SHIPPED | OUTPATIENT
Start: 2023-07-19

## 2023-10-19 ENCOUNTER — OFFICE VISIT (OUTPATIENT)
Facility: CLINIC | Age: 88
End: 2023-10-19
Payer: MEDICARE

## 2023-10-19 VITALS
BODY MASS INDEX: 32.2 KG/M2 | DIASTOLIC BLOOD PRESSURE: 61 MMHG | TEMPERATURE: 97 F | HEIGHT: 60 IN | WEIGHT: 164 LBS | OXYGEN SATURATION: 97 % | HEART RATE: 68 BPM | SYSTOLIC BLOOD PRESSURE: 125 MMHG | RESPIRATION RATE: 20 BRPM

## 2023-10-19 DIAGNOSIS — Z79.899 LONG TERM USE OF DRUG: ICD-10-CM

## 2023-10-19 DIAGNOSIS — N18.32 TYPE 2 DIABETES MELLITUS WITH STAGE 3B CHRONIC KIDNEY DISEASE, WITHOUT LONG-TERM CURRENT USE OF INSULIN (HCC): ICD-10-CM

## 2023-10-19 DIAGNOSIS — E11.22 TYPE 2 DIABETES MELLITUS WITH STAGE 3B CHRONIC KIDNEY DISEASE, WITHOUT LONG-TERM CURRENT USE OF INSULIN (HCC): ICD-10-CM

## 2023-10-19 DIAGNOSIS — Z23 NEED FOR VACCINATION: ICD-10-CM

## 2023-10-19 DIAGNOSIS — I48.0 PAROXYSMAL ATRIAL FIBRILLATION (HCC): ICD-10-CM

## 2023-10-19 DIAGNOSIS — I10 BENIGN HYPERTENSION: Primary | ICD-10-CM

## 2023-10-19 PROCEDURE — 99214 OFFICE O/P EST MOD 30 MIN: CPT | Performed by: FAMILY MEDICINE

## 2023-10-19 PROCEDURE — G0008 ADMIN INFLUENZA VIRUS VAC: HCPCS | Performed by: FAMILY MEDICINE

## 2023-10-19 PROCEDURE — 90694 VACC AIIV4 NO PRSRV 0.5ML IM: CPT | Performed by: FAMILY MEDICINE

## 2023-10-19 PROCEDURE — 1123F ACP DISCUSS/DSCN MKR DOCD: CPT | Performed by: FAMILY MEDICINE

## 2023-10-19 PROCEDURE — 3044F HG A1C LEVEL LT 7.0%: CPT | Performed by: FAMILY MEDICINE

## 2023-10-19 RX ORDER — LEVOFLOXACIN 500 MG/1
TABLET, FILM COATED ORAL
COMMUNITY
Start: 2023-10-13

## 2023-10-19 RX ORDER — LEVALBUTEROL INHALATION SOLUTION 0.63 MG/3ML
0.63 SOLUTION RESPIRATORY (INHALATION)
COMMUNITY
Start: 2023-05-05

## 2023-10-19 ASSESSMENT — ENCOUNTER SYMPTOMS
SHORTNESS OF BREATH: 0
COUGH: 0

## 2023-10-19 NOTE — PROGRESS NOTES
[facility]  Chief Complaint   Patient presents with    Follow-up Chronic Condition    Medication Management       History of Present Illness:   Tiago Thomas is a 80 y.o. female       HPI:  Here for f/u HTN, Diabetes. Due for labs. She is at [address] now and provider there is writing her gabapentin now. She has neck pain, chronic left shoulder pain and more recently right shoulder. She saw orthopedist.  She has right lower extremity numbness which she takes gabapentin for and decreased range of motion in her right shoulder from rotator cuff tear. [facility]. 8 falls since March. I reduced her metoprolol due to hypotension and falls but it looks like the facility is giving her my lower dose and the old dose per their provider.     Health Maintenance  Health Maintenance Due   Topic Date Due    Hepatitis B vaccine (1 of 3 - Risk 3-dose series) Never done    COVID-19 Vaccine (5 - Pfizer series) 02/18/2023       Past Medical, Family, and Social History:     Past Medical History:   Diagnosis Date    Allergic rhinitis due to pollen     Anxiety state, unspecified     Backache, unspecified     Basal cell carcinoma     eyelid    Chronic maxillary sinusitis     Depression     Diabetes ([address])     Diabetes mellitus without mention of complication     Dizziness and giddiness     Unspecified disorder of ankle and foot joint     Unspecified essential hypertension     Urinary tract infection, site not specified       Past Surgical History:   Procedure Laterality Date    BREAST SURGERY      GYN      PACEMAKER N/A        Current Outpatient Medications on File Prior to Visit   Medication Sig Dispense Refill    diclofenac sodium (VOLTAREN) 1 % GEL       levoFLOXacin (LEVAQUIN) 500 MG tablet       levalbuterol (XOPENEX) 0.63 MG/3ML nebulization 3 mLs      latanoprost (XALATAN) 0.005 % ophthalmic solution Place 1 drop into both eyes daily      nystatin

## 2023-10-20 LAB
ALBUMIN SERPL-MCNC: 3.2 G/DL (ref 3.5–5)
ALBUMIN/GLOB SERPL: 0.9 (ref 1.1–2.2)
ALP SERPL-CCNC: 98 U/L (ref 45–117)
ALT SERPL-CCNC: 18 U/L (ref 12–78)
ANION GAP SERPL CALC-SCNC: 5 MMOL/L (ref 5–15)
AST SERPL-CCNC: 16 U/L (ref 15–37)
BASOPHILS # BLD: 0 K/UL (ref 0–0.1)
BASOPHILS NFR BLD: 1 % (ref 0–1)
BILIRUB SERPL-MCNC: 0.2 MG/DL (ref 0.2–1)
BUN SERPL-MCNC: 32 MG/DL (ref 6–20)
BUN/CREAT SERPL: 23 (ref 12–20)
CALCIUM SERPL-MCNC: 9.1 MG/DL (ref 8.5–10.1)
CHLORIDE SERPL-SCNC: 108 MMOL/L (ref 97–108)
CO2 SERPL-SCNC: 29 MMOL/L (ref 21–32)
CREAT SERPL-MCNC: 1.4 MG/DL (ref 0.55–1.02)
DIFFERENTIAL METHOD BLD: ABNORMAL
EOSINOPHIL # BLD: 0.1 K/UL (ref 0–0.4)
EOSINOPHIL NFR BLD: 1 % (ref 0–7)
ERYTHROCYTE [DISTWIDTH] IN BLOOD BY AUTOMATED COUNT: 13.4 % (ref 11.5–14.5)
EST. AVERAGE GLUCOSE BLD GHB EST-MCNC: 174 MG/DL
GLOBULIN SER CALC-MCNC: 3.4 G/DL (ref 2–4)
GLUCOSE SERPL-MCNC: 146 MG/DL (ref 65–100)
HBA1C MFR BLD: 7.7 % (ref 4–5.6)
HCT VFR BLD AUTO: 39.2 % (ref 35–47)
HGB BLD-MCNC: 11.7 G/DL (ref 11.5–16)
IMM GRANULOCYTES # BLD AUTO: 0.1 K/UL (ref 0–0.04)
IMM GRANULOCYTES NFR BLD AUTO: 1 % (ref 0–0.5)
LYMPHOCYTES # BLD: 1.5 K/UL (ref 0.8–3.5)
LYMPHOCYTES NFR BLD: 19 % (ref 12–49)
MCH RBC QN AUTO: 26.9 PG (ref 26–34)
MCHC RBC AUTO-ENTMCNC: 29.8 G/DL (ref 30–36.5)
MCV RBC AUTO: 90.1 FL (ref 80–99)
MONOCYTES # BLD: 0.6 K/UL (ref 0–1)
MONOCYTES NFR BLD: 7 % (ref 5–13)
NEUTS SEG # BLD: 5.7 K/UL (ref 1.8–8)
NEUTS SEG NFR BLD: 71 % (ref 32–75)
NRBC # BLD: 0 K/UL (ref 0–0.01)
NRBC BLD-RTO: 0 PER 100 WBC
PLATELET # BLD AUTO: 364 K/UL (ref 150–400)
PMV BLD AUTO: 10.2 FL (ref 8.9–12.9)
POTASSIUM SERPL-SCNC: 4.6 MMOL/L (ref 3.5–5.1)
PROT SERPL-MCNC: 6.6 G/DL (ref 6.4–8.2)
RBC # BLD AUTO: 4.35 M/UL (ref 3.8–5.2)
SODIUM SERPL-SCNC: 142 MMOL/L (ref 136–145)
WBC # BLD AUTO: 8 K/UL (ref 3.6–11)

## 2023-10-21 ASSESSMENT — VISUAL ACUITY
OS_SC: 20/30+2
OS_CC: 20/50
OD_SC: 20/50-3
OD_SC: 20/30+2
OS_SC: 20/50+1
OS_SC: 20/50-2
OD_BAT: 20/100
OD_CC: 20/60
OD_CC: 20/60
OD_SC: 20/30+2
OD_SC: 20/50-3
OS_SC: 20/50+1
OD_SC: 20/30-2
OS_CC: 20/50
OS_SC: 20/30-2

## 2023-10-21 ASSESSMENT — KERATOMETRY
OS_K2POWER_DIOPTERS: 44.58
OD_K2POWER_DIOPTERS: 44.75
OS_AXISANGLE2_DEGREES: 135
OD_AXISANGLE_DEGREES: 98
OD_AXISANGLE2_DEGREES: 008
OD_K1POWER_DIOPTERS: 43.53
OS_AXISANGLE_DEGREES: 45
OS_K1POWER_DIOPTERS: 44.24

## 2023-10-21 ASSESSMENT — TONOMETRY
OD_IOP_MMHG: 12
OS_IOP_MMHG: 12
OD_IOP_MMHG: 15
OS_IOP_MMHG: 18

## 2023-11-20 ENCOUNTER — TELEPHONE (OUTPATIENT)
Facility: CLINIC | Age: 88
End: 2023-11-20

## 2023-11-20 NOTE — TELEPHONE ENCOUNTER
Pt is on her way to  [place] with her daughter. The Nurses at the Facility forgot to give the daughter the Gabapentin she is on. Daughter is having to stop every 30 to 40 minutes now because Pt is in so much pain. [address], [address] [phone]  Open ? Closes 11? PM    Please call her script in there. If you need to speak to her daughter. They are en-route to their destination now.  [facility] in [place])

## 2023-11-20 NOTE — TELEPHONE ENCOUNTER
Spoke w/ patient daughter and advised that Dr. Justine Olsen is unable to send Rx to [facility] due to it being a controlled substance.

## 2024-01-19 ENCOUNTER — TRANSCRIBE ORDERS (OUTPATIENT)
Facility: HOSPITAL | Age: 89
End: 2024-01-19

## 2024-01-19 DIAGNOSIS — M54.12 CERVICAL RADICULITIS: Primary | ICD-10-CM

## 2024-03-18 LAB
CHOLESTEROL, TOTAL: 133 MG/DL
CHOLESTEROL/HDL RATIO: NORMAL
ESTIMATED AVERAGE GLUCOSE: 209
HBA1C MFR BLD: 8.9 %
HDLC SERPL-MCNC: 46 MG/DL (ref 35–70)
LDL CHOLESTEROL: 61
NONHDLC SERPL-MCNC: NORMAL MG/DL
TRIGL SERPL-MCNC: 131 MG/DL
VLDLC SERPL CALC-MCNC: NORMAL MG/DL

## 2024-03-21 ENCOUNTER — HOSPITAL ENCOUNTER (OUTPATIENT)
Facility: HOSPITAL | Age: 89
Discharge: HOME | End: 2024-03-21
Payer: MEDICARE

## 2024-03-21 DIAGNOSIS — M54.12 CERVICAL RADICULITIS: ICD-10-CM

## 2024-03-21 PROCEDURE — 72141 MRI NECK SPINE W/O DYE: CPT

## 2024-04-22 ENCOUNTER — OFFICE VISIT (OUTPATIENT)
Facility: CLINIC | Age: 89
End: 2024-04-22
Payer: MEDICARE

## 2024-04-22 VITALS
BODY MASS INDEX: 30.21 KG/M2 | OXYGEN SATURATION: 97 % | DIASTOLIC BLOOD PRESSURE: 64 MMHG | HEIGHT: 61 IN | TEMPERATURE: 97.5 F | RESPIRATION RATE: 17 BRPM | WEIGHT: 160 LBS | SYSTOLIC BLOOD PRESSURE: 129 MMHG | HEART RATE: 70 BPM

## 2024-04-22 DIAGNOSIS — L60.0 INGROWN TOENAIL OF RIGHT FOOT: ICD-10-CM

## 2024-04-22 DIAGNOSIS — I48.0 PAROXYSMAL ATRIAL FIBRILLATION (HCC): ICD-10-CM

## 2024-04-22 DIAGNOSIS — N18.32 TYPE 2 DIABETES MELLITUS WITH STAGE 3B CHRONIC KIDNEY DISEASE, WITHOUT LONG-TERM CURRENT USE OF INSULIN (HCC): Primary | ICD-10-CM

## 2024-04-22 DIAGNOSIS — E11.22 TYPE 2 DIABETES MELLITUS WITH STAGE 3B CHRONIC KIDNEY DISEASE, WITHOUT LONG-TERM CURRENT USE OF INSULIN (HCC): Primary | ICD-10-CM

## 2024-04-22 DIAGNOSIS — I10 BENIGN HYPERTENSION: ICD-10-CM

## 2024-04-22 DIAGNOSIS — Z79.01 ANTICOAGULATED: ICD-10-CM

## 2024-04-22 PROCEDURE — G2211 COMPLEX E/M VISIT ADD ON: HCPCS | Performed by: FAMILY MEDICINE

## 2024-04-22 PROCEDURE — 99214 OFFICE O/P EST MOD 30 MIN: CPT | Performed by: FAMILY MEDICINE

## 2024-04-22 PROCEDURE — 1123F ACP DISCUSS/DSCN MKR DOCD: CPT | Performed by: FAMILY MEDICINE

## 2024-04-22 RX ORDER — GLIPIZIDE 5 MG/1
2.5 TABLET ORAL DAILY
COMMUNITY
Start: 2024-04-01

## 2024-04-22 ASSESSMENT — PATIENT HEALTH QUESTIONNAIRE - PHQ9
8. MOVING OR SPEAKING SO SLOWLY THAT OTHER PEOPLE COULD HAVE NOTICED. OR THE OPPOSITE, BEING SO FIGETY OR RESTLESS THAT YOU HAVE BEEN MOVING AROUND A LOT MORE THAN USUAL: NOT AT ALL
3. TROUBLE FALLING OR STAYING ASLEEP: NOT AT ALL
SUM OF ALL RESPONSES TO PHQ9 QUESTIONS 1 & 2: 1
SUM OF ALL RESPONSES TO PHQ QUESTIONS 1-9: 1
SUM OF ALL RESPONSES TO PHQ QUESTIONS 1-9: 1
4. FEELING TIRED OR HAVING LITTLE ENERGY: NOT AT ALL
SUM OF ALL RESPONSES TO PHQ QUESTIONS 1-9: 1
SUM OF ALL RESPONSES TO PHQ QUESTIONS 1-9: 1
5. POOR APPETITE OR OVEREATING: NOT AT ALL
2. FEELING DOWN, DEPRESSED OR HOPELESS: SEVERAL DAYS
9. THOUGHTS THAT YOU WOULD BE BETTER OFF DEAD, OR OF HURTING YOURSELF: NOT AT ALL
7. TROUBLE CONCENTRATING ON THINGS, SUCH AS READING THE NEWSPAPER OR WATCHING TELEVISION: NOT AT ALL
6. FEELING BAD ABOUT YOURSELF - OR THAT YOU ARE A FAILURE OR HAVE LET YOURSELF OR YOUR FAMILY DOWN: NOT AT ALL
10. IF YOU CHECKED OFF ANY PROBLEMS, HOW DIFFICULT HAVE THESE PROBLEMS MADE IT FOR YOU TO DO YOUR WORK, TAKE CARE OF THINGS AT HOME, OR GET ALONG WITH OTHER PEOPLE: NOT DIFFICULT AT ALL
1. LITTLE INTEREST OR PLEASURE IN DOING THINGS: NOT AT ALL

## 2024-04-22 ASSESSMENT — ENCOUNTER SYMPTOMS
SHORTNESS OF BREATH: 0
COUGH: 0

## 2024-04-22 NOTE — PROGRESS NOTES
[facility]  Chief Complaint   Patient presents with    Follow-up Chronic Condition       History of Present Illness:   Idalmis Skaggs is a 89 y.o. female       HPI:  Here for f/u HTN, Diabetes.  She is at [facility] now and provider there is writing her gabapentin now.   She has neck pain, chronic left shoulder pain and more recently right shoulder.   She saw orthopedist.  She has right lower extremity numbness which she takes gabapentin for and decreased range of motion in her right shoulder from rotator cuff tear.  Had injection and MRI on neck.     Provider at facility started her on glipizide 5 mg daily due to elevated fsbs up to 500 per daughter report. They did labs including A1c last month and verbal report was 8.9.     She c/o that her R great toe hurts. She had a pedicure done recently. No drainage.  Hemoglobin A1C   Date Value Ref Range Status   10/19/2023 7.7 (H) 4.0 - 5.6 % Final     Comment:     (NOTE)  HbA1C Interpretive Ranges  <5.7              Normal  5.7 - 6.4         Consider Prediabetes  >6.5              Consider Diabetes           Health Maintenance  Health Maintenance Due   Topic Date Due    Annual Wellness Visit (Medicare Advantage)  01/01/2024    Lipids  04/18/2024       Past Medical, Family, and Social History:     Past Medical History:   Diagnosis Date    Allergic rhinitis due to pollen     Anxiety state, unspecified     Backache, unspecified     Basal cell carcinoma     eyelid    Chronic maxillary sinusitis     Depression     Diabetes (HCC)     Diabetes mellitus without mention of complication     Dizziness and giddiness     Unspecified disorder of ankle and foot joint     Unspecified essential hypertension     Urinary tract infection, site not specified       Past Surgical History:   Procedure Laterality Date    BREAST SURGERY      GYN      PACEMAKER N/A        Current Outpatient Medications on File Prior to Visit   Medication Sig Dispense Refill

## 2024-04-22 NOTE — PROGRESS NOTES
\"Have you been to the ER, urgent care clinic since your last visit?  Hospitalized since your last visit?\"    NO    “Have you seen or consulted any other health care providers outside of [facility] since your last visit?”    NO            Click Here for Release of Records Request     Health Maintenance Due   Topic Date Due    Annual Wellness Visit (Medicare Advantage)  01/01/2024    Lipids  04/18/2024

## 2024-08-28 ENCOUNTER — OFFICE VISIT (OUTPATIENT)
Facility: CLINIC | Age: 89
End: 2024-08-28
Payer: MEDICARE

## 2024-08-28 VITALS
RESPIRATION RATE: 18 BRPM | OXYGEN SATURATION: 97 % | TEMPERATURE: 97.3 F | SYSTOLIC BLOOD PRESSURE: 116 MMHG | HEART RATE: 62 BPM | WEIGHT: 159.2 LBS | DIASTOLIC BLOOD PRESSURE: 50 MMHG | HEIGHT: 61 IN | BODY MASS INDEX: 30.06 KG/M2

## 2024-08-28 DIAGNOSIS — M25.512 CHRONIC LEFT SHOULDER PAIN: ICD-10-CM

## 2024-08-28 DIAGNOSIS — K59.00 CONSTIPATION, UNSPECIFIED CONSTIPATION TYPE: ICD-10-CM

## 2024-08-28 DIAGNOSIS — Z00.00 MEDICARE ANNUAL WELLNESS VISIT, SUBSEQUENT: ICD-10-CM

## 2024-08-28 DIAGNOSIS — G89.29 CHRONIC LEFT SHOULDER PAIN: ICD-10-CM

## 2024-08-28 DIAGNOSIS — L98.9 SKIN LESION: ICD-10-CM

## 2024-08-28 DIAGNOSIS — R10.84 GENERALIZED ABDOMINAL PAIN: Primary | ICD-10-CM

## 2024-08-28 PROBLEM — E11.319 DIABETIC RETINOPATHY ASSOCIATED WITH TYPE 2 DIABETES MELLITUS (HCC): Status: ACTIVE | Noted: 2020-06-19

## 2024-08-28 PROBLEM — Z86.69 HISTORY OF GLAUCOMA: Status: ACTIVE | Noted: 2018-10-02

## 2024-08-28 PROBLEM — M85.80 OSTEOPENIA: Status: ACTIVE | Noted: 2019-05-16

## 2024-08-28 PROBLEM — Z85.828 HISTORY OF BASAL CELL CARCINOMA OF EYELID: Status: ACTIVE | Noted: 2018-10-02

## 2024-08-28 PROCEDURE — G0439 PPPS, SUBSEQ VISIT: HCPCS | Performed by: FAMILY MEDICINE

## 2024-08-28 PROCEDURE — 1123F ACP DISCUSS/DSCN MKR DOCD: CPT | Performed by: FAMILY MEDICINE

## 2024-08-28 PROCEDURE — 99214 OFFICE O/P EST MOD 30 MIN: CPT | Performed by: FAMILY MEDICINE

## 2024-08-28 RX ORDER — SENNOSIDES A AND B 8.6 MG/1
1 TABLET, FILM COATED ORAL 2 TIMES DAILY
Qty: 60 TABLET | Refills: 11 | Status: SHIPPED | OUTPATIENT
Start: 2024-08-28 | End: 2025-08-28

## 2024-08-28 RX ORDER — ERGOCALCIFEROL 1.25 MG/1
CAPSULE, LIQUID FILLED ORAL
COMMUNITY
Start: 2024-08-22

## 2024-08-28 RX ORDER — LANCETS 28 GAUGE
EACH MISCELLANEOUS
COMMUNITY
Start: 2024-05-31

## 2024-08-28 SDOH — ECONOMIC STABILITY: INCOME INSECURITY: HOW HARD IS IT FOR YOU TO PAY FOR THE VERY BASICS LIKE FOOD, HOUSING, MEDICAL CARE, AND HEATING?: NOT HARD AT ALL

## 2024-08-28 SDOH — ECONOMIC STABILITY: FOOD INSECURITY: WITHIN THE PAST 12 MONTHS, YOU WORRIED THAT YOUR FOOD WOULD RUN OUT BEFORE YOU GOT MONEY TO BUY MORE.: NEVER TRUE

## 2024-08-28 SDOH — ECONOMIC STABILITY: FOOD INSECURITY: WITHIN THE PAST 12 MONTHS, THE FOOD YOU BOUGHT JUST DIDN'T LAST AND YOU DIDN'T HAVE MONEY TO GET MORE.: NEVER TRUE

## 2024-08-28 ASSESSMENT — PATIENT HEALTH QUESTIONNAIRE - PHQ9
10. IF YOU CHECKED OFF ANY PROBLEMS, HOW DIFFICULT HAVE THESE PROBLEMS MADE IT FOR YOU TO DO YOUR WORK, TAKE CARE OF THINGS AT HOME, OR GET ALONG WITH OTHER PEOPLE: NOT DIFFICULT AT ALL
4. FEELING TIRED OR HAVING LITTLE ENERGY: NOT AT ALL
6. FEELING BAD ABOUT YOURSELF - OR THAT YOU ARE A FAILURE OR HAVE LET YOURSELF OR YOUR FAMILY DOWN: NOT AT ALL
2. FEELING DOWN, DEPRESSED OR HOPELESS: NOT AT ALL
3. TROUBLE FALLING OR STAYING ASLEEP: NOT AT ALL
SUM OF ALL RESPONSES TO PHQ QUESTIONS 1-9: 1
SUM OF ALL RESPONSES TO PHQ QUESTIONS 1-9: 1
5. POOR APPETITE OR OVEREATING: NOT AT ALL
SUM OF ALL RESPONSES TO PHQ9 QUESTIONS 1 & 2: 0
1. LITTLE INTEREST OR PLEASURE IN DOING THINGS: NOT AT ALL
8. MOVING OR SPEAKING SO SLOWLY THAT OTHER PEOPLE COULD HAVE NOTICED. OR THE OPPOSITE, BEING SO FIGETY OR RESTLESS THAT YOU HAVE BEEN MOVING AROUND A LOT MORE THAN USUAL: NOT AT ALL
7. TROUBLE CONCENTRATING ON THINGS, SUCH AS READING THE NEWSPAPER OR WATCHING TELEVISION: SEVERAL DAYS
9. THOUGHTS THAT YOU WOULD BE BETTER OFF DEAD, OR OF HURTING YOURSELF: NOT AT ALL
SUM OF ALL RESPONSES TO PHQ QUESTIONS 1-9: 1
SUM OF ALL RESPONSES TO PHQ QUESTIONS 1-9: 1

## 2024-08-28 ASSESSMENT — ENCOUNTER SYMPTOMS
SHORTNESS OF BREATH: 0
COUGH: 0
WHEEZING: 0
CONSTIPATION: 0
VOMITING: 0
ABDOMINAL PAIN: 1
BLOOD IN STOOL: 0
NAUSEA: 0

## 2024-08-28 ASSESSMENT — LIFESTYLE VARIABLES
HOW MANY STANDARD DRINKS CONTAINING ALCOHOL DO YOU HAVE ON A TYPICAL DAY: PATIENT DOES NOT DRINK
HOW OFTEN DO YOU HAVE A DRINK CONTAINING ALCOHOL: NEVER

## 2024-08-28 NOTE — PATIENT INSTRUCTIONS
Preventing Falls: Care Instructions  Injuries and health problems such as trouble walking or poor eyesight can increase your risk of falling. So can some medicines. But there are things you can do to help prevent falls. You can exercise to get stronger. You can also arrange your home to make it safer.    Talk to your doctor about the medicines you take. Ask if any of them increase the risk of falls and whether they can be changed or stopped.   Try to exercise regularly. It can help improve your strength and balance. This can help lower your risk of falling.         Practice fall safety and prevention.   Wear low-heeled shoes that fit well and give your feet good support. Talk to your doctor if you have foot problems that make this hard.  Carry a cellphone or wear a medical alert device that you can use to call for help.  Use stepladders instead of chairs to reach high objects. Don't climb if you're at risk for falls. Ask for help, if needed.  Wear the correct eyeglasses, if you need them.        Make your home safer.   Remove rugs, cords, clutter, and furniture from walkways.  Keep your house well lit. Use night-lights in hallways and bathrooms.  Install and use sturdy handrails on stairways.  Wear nonskid footwear, even inside. Don't walk barefoot or in socks without shoes.        Be safe outside.   Use handrails, curb cuts, and ramps whenever possible.  Keep your hands free by using a shoulder bag or backpack.  Try to walk in well-lit areas. Watch out for uneven ground, changes in pavement, and debris.  Be careful in the winter. Walk on the grass or gravel when sidewalks are slippery. Use de-icer on steps and walkways. Add non-slip devices to shoes.    Put grab bars and nonskid mats in your shower or tub and near the toilet. Try to use a shower chair or bath bench when bathing.   Get into a tub or shower by putting in your weaker leg first. Get out with your strong side first. Have a phone or medical alert  more changes.  Some people find it helpful to take an exercise or nutrition class. If you have questions, ask your doctor about seeing a registered dietitian or an exercise specialist. You might also think about joining a weight-loss support group.  If you're not ready to make changes right now, try to pick a date in the future. Then make an appointment with your doctor to talk about when and how you'll get started with a plan.  Follow-up care is a key part of your treatment and safety. Be sure to make and go to all appointments, and call your doctor if you are having problems. It's also a good idea to know your test results and keep a list of the medicines you take.  How can you care for yourself at home?  Set realistic goals. Many people expect to lose much more weight than is likely. A weight loss of 5% to 10% of your body weight may be enough to improve your health.  Get family and friends involved to provide support. Talk to them about why you are trying to lose weight, and ask them to help. They can help by participating in exercise and having meals with you, even if they may be eating something different.  Find what works best for you. If you do not have time or do not like to cook, a program that offers meal replacement bars or shakes may be better for you. Or if you like to prepare meals, finding a plan that includes daily menus and recipes may be best.  Ask your doctor about other health professionals who can help you achieve your weight loss goals.  A dietitian can help you make healthy changes in your diet.  An exercise specialist or  can help you develop a safe and effective exercise program.  A counselor or psychiatrist can help you cope with issues such as depression, anxiety, or family problems that can make it hard to focus on weight loss.  Consider joining a support group for people who are trying to lose weight. Your doctor can suggest groups in your area.  Where can you learn

## 2024-08-28 NOTE — PROGRESS NOTES
The following Annual Medicare Wellness Exam is distinct and separate from the medical evaluation and decision making.  Medicare Annual Wellness Visit    Idalmis Skaggs is here for Medicare AWV    Assessment & Plan   Generalized abdominal pain  -     XR ABDOMEN (KUB) (SINGLE AP VIEW); Future  Chronic left shoulder pain  -     External Referral To Pain Clinic  Medicare annual wellness visit, subsequent  Constipation, unspecified constipation type  -     senna (SENOKOT) 8.6 MG tablet; Take 1 tablet by mouth 2 times daily, Disp-60 tablet, R-11Normal  Skin lesion  -     External Referral To Dermatology     Recommendations for Preventive Services Due: see orders and patient instructions/AVS.  Recommended screening schedule for the next 5-10 years is provided to the patient in written form: see Patient Instructions/AVS.     Return in about 6 months (around 2/28/2025) for DM.     Subjective       Patient's complete Health Risk Assessment and screening values have been reviewed and are found in Flowsheets. The following problems were reviewed today and where indicated follow up appointments were made and/or referrals ordered.    Positive Risk Factor Screenings with Interventions:    Fall Risk:  Do you feel unsteady or are you worried about falling? : (!) yes  2 or more falls in past year?: (!) yes  Fall with injury in past year?: no     Interventions:    Reviewed medications, home hazards, visual acuity, and co-morbidities that can increase risk for falls             Inactivity:  On average, how many days per week do you engage in moderate to strenuous exercise (like a brisk walk)?: 2 days (!) Abnormal  On average, how many minutes do you engage in exercise at this level?: 10 min  Interventions:  See AVS for additional education material     Abnormal BMI (obese):  Body mass index is 30.08 kg/m². (!) Abnormal  Interventions:  See AVS for additional education material            ADL's:   Patient reports needing help

## 2024-08-28 NOTE — PROGRESS NOTES
\"Have you been to the ER, urgent care clinic since your last visit?  Hospitalized since your last visit?\"    NO    “Have you seen or consulted any other health care providers outside of [facility] since your last visit?”    NO            Click Here for Release of Records Request

## 2024-08-28 NOTE — PROGRESS NOTES
[facility]  Chief Complaint   Patient presents with    Medicare AWV       History of Present Illness:   Idalmis Skaggs is a 89 y.o. female       HPI:  Here for f/u HTN, Diabetes.  She is at [facility] now and provider there is writing her gabapentin now and managing her diabetes.   She has neck pain, chronic left shoulder pain and right shoulder.   She saw orthopedist. Requests to see pain management.   She has right lower extremity numbness which she takes gabapentin for and decreased range of motion in her right shoulder from rotator cuff tear.  Had injection and MRI on neck.     Provider at facility started her on glipizide 5 mg daily due to elevated fsbs up to 500 per daughter report. They did labs 3/24 and was 8.9.     C/o abd pain here today, says bms are regular. On colace.     Health Maintenance  Health Maintenance Due   Topic Date Due    Annual Wellness Visit (Medicare Advantage)  01/01/2024    COVID-19 Vaccine (6 - 2023-24 season) 02/19/2024    Flu vaccine (1) 08/01/2024       Past Medical, Family, and Social History:     Past Medical History:   Diagnosis Date    Allergic rhinitis due to pollen     Anxiety state, unspecified     Backache, unspecified     Basal cell carcinoma     eyelid    Chronic maxillary sinusitis     Depression     Diabetes (HCC)     Diabetes mellitus without mention of complication     Dizziness and giddiness     Unspecified disorder of ankle and foot joint     Unspecified essential hypertension     Urinary tract infection, site not specified       Past Surgical History:   Procedure Laterality Date    BREAST SURGERY      GYN      PACEMAKER N/A        Current Outpatient Medications on File Prior to Visit   Medication Sig Dispense Refill    vitamin D (ERGOCALCIFEROL) 1.25 MG (21775 UT) CAPS capsule       Easy Touch Lancets 28G MISC       glipiZIDE (GLUCOTROL) 5 MG tablet Take 0.5 tablets by mouth daily      diclofenac sodium (VOLTAREN) 1 % GEL        1 tablet by mouth 2 times daily    Skin lesion       Orders:    External Referral To Dermatology  Form signed for facility  Justification for level of billing, time spent: 34 min with patient, reviewing chart and face to face exam, clinical documentation. Time prior to the visit was spent reviewing external notes results and imaging reports.  As well during the visit, time included evaluating the patient, discussing results and plans with the patient, and coordinating care.  As well, after the visit additional time spent documenting clinical care, interpreting results, and coordinating care.  This time was all spent during the date of service.    Return in about 6 months (around 2/28/2025) for DM.   I have discussed the diagnosis with the patient and the intended plan as seen in the above orders.  Social history, medical history, and labs were reviewed.  The patient has received an after-visit summary and questions were answered concerning future plans.  I have discussed medication side effects and warnings with the patient as well. Patient verbalized understanding and accepts plan & risks.        Kimbrely Quiles MD  [facility]  08/28/24

## 2024-09-15 ENCOUNTER — APPOINTMENT (OUTPATIENT)
Facility: HOSPITAL | Age: 89
End: 2024-09-15
Payer: MEDICARE

## 2024-09-15 ENCOUNTER — HOSPITAL ENCOUNTER (EMERGENCY)
Facility: HOSPITAL | Age: 89
Discharge: HOME | End: 2024-09-15
Attending: EMERGENCY MEDICINE
Payer: MEDICARE

## 2024-09-15 VITALS
HEART RATE: 85 BPM | RESPIRATION RATE: 16 BRPM | OXYGEN SATURATION: 97 % | DIASTOLIC BLOOD PRESSURE: 76 MMHG | SYSTOLIC BLOOD PRESSURE: 172 MMHG | TEMPERATURE: 97.6 F | BODY MASS INDEX: 31.16 KG/M2 | WEIGHT: 158.73 LBS | HEIGHT: 60 IN

## 2024-09-15 DIAGNOSIS — S96.911A STRAIN OF RIGHT FOOT, INITIAL ENCOUNTER: ICD-10-CM

## 2024-09-15 DIAGNOSIS — M79.89 LEG SWELLING: Primary | ICD-10-CM

## 2024-09-15 LAB
ALBUMIN SERPL-MCNC: 3.6 G/DL (ref 3.5–5)
ALBUMIN/GLOB SERPL: 1.1 (ref 1.1–2.2)
ALP SERPL-CCNC: 94 U/L (ref 45–117)
ALT SERPL-CCNC: 17 U/L (ref 12–78)
ANION GAP SERPL CALC-SCNC: 3 MMOL/L (ref 2–12)
AST SERPL-CCNC: 20 U/L (ref 15–37)
BASOPHILS # BLD: 0 K/UL (ref 0–0.1)
BASOPHILS NFR BLD: 0 % (ref 0–1)
BILIRUB SERPL-MCNC: 0.4 MG/DL (ref 0.2–1)
BUN SERPL-MCNC: 27 MG/DL (ref 6–20)
BUN/CREAT SERPL: 20 (ref 12–20)
CALCIUM SERPL-MCNC: 9.3 MG/DL (ref 8.5–10.1)
CHLORIDE SERPL-SCNC: 108 MMOL/L (ref 97–108)
CO2 SERPL-SCNC: 29 MMOL/L (ref 21–32)
CREAT SERPL-MCNC: 1.32 MG/DL (ref 0.55–1.02)
DIFFERENTIAL METHOD BLD: ABNORMAL
EOSINOPHIL # BLD: 0.1 K/UL (ref 0–0.4)
EOSINOPHIL NFR BLD: 1 % (ref 0–7)
ERYTHROCYTE [DISTWIDTH] IN BLOOD BY AUTOMATED COUNT: 14.4 % (ref 11.5–14.5)
GLOBULIN SER CALC-MCNC: 3.4 G/DL (ref 2–4)
GLUCOSE SERPL-MCNC: 65 MG/DL (ref 65–100)
HCT VFR BLD AUTO: 35.3 % (ref 35–47)
HGB BLD-MCNC: 10.9 G/DL (ref 11.5–16)
IMM GRANULOCYTES # BLD AUTO: 0 K/UL (ref 0–0.04)
IMM GRANULOCYTES NFR BLD AUTO: 0 % (ref 0–0.5)
LYMPHOCYTES # BLD: 1.9 K/UL (ref 0.8–3.5)
LYMPHOCYTES NFR BLD: 25 % (ref 12–49)
MCH RBC QN AUTO: 27.7 PG (ref 26–34)
MCHC RBC AUTO-ENTMCNC: 30.9 G/DL (ref 30–36.5)
MCV RBC AUTO: 89.8 FL (ref 80–99)
MONOCYTES # BLD: 0.7 K/UL (ref 0–1)
MONOCYTES NFR BLD: 9 % (ref 5–13)
NEUTS SEG # BLD: 4.8 K/UL (ref 1.8–8)
NEUTS SEG NFR BLD: 65 % (ref 32–75)
NRBC # BLD: 0 K/UL (ref 0–0.01)
NRBC BLD-RTO: 0 PER 100 WBC
PLATELET # BLD AUTO: 214 K/UL (ref 150–400)
PMV BLD AUTO: 9.8 FL (ref 8.9–12.9)
POTASSIUM SERPL-SCNC: 4.8 MMOL/L (ref 3.5–5.1)
PROT SERPL-MCNC: 7 G/DL (ref 6.4–8.2)
RBC # BLD AUTO: 3.93 M/UL (ref 3.8–5.2)
SODIUM SERPL-SCNC: 140 MMOL/L (ref 136–145)
WBC # BLD AUTO: 7.5 K/UL (ref 3.6–11)

## 2024-09-15 PROCEDURE — 99284 EMERGENCY DEPT VISIT MOD MDM: CPT

## 2024-09-15 PROCEDURE — 73630 X-RAY EXAM OF FOOT: CPT

## 2024-09-15 PROCEDURE — 36415 COLL VENOUS BLD VENIPUNCTURE: CPT

## 2024-09-15 PROCEDURE — 85025 COMPLETE CBC W/AUTO DIFF WBC: CPT

## 2024-09-15 PROCEDURE — 80053 COMPREHEN METABOLIC PANEL: CPT

## 2025-03-03 ENCOUNTER — OFFICE VISIT (OUTPATIENT)
Facility: CLINIC | Age: 89
End: 2025-03-03

## 2025-03-03 VITALS
WEIGHT: 166 LBS | DIASTOLIC BLOOD PRESSURE: 58 MMHG | HEART RATE: 62 BPM | RESPIRATION RATE: 14 BRPM | TEMPERATURE: 97.9 F | SYSTOLIC BLOOD PRESSURE: 123 MMHG | BODY MASS INDEX: 32.59 KG/M2 | OXYGEN SATURATION: 98 % | HEIGHT: 60 IN

## 2025-03-03 DIAGNOSIS — I48.0 PAROXYSMAL ATRIAL FIBRILLATION (HCC): ICD-10-CM

## 2025-03-03 DIAGNOSIS — E11.22 TYPE 2 DIABETES MELLITUS WITH STAGE 3B CHRONIC KIDNEY DISEASE, WITHOUT LONG-TERM CURRENT USE OF INSULIN (HCC): ICD-10-CM

## 2025-03-03 DIAGNOSIS — Z79.899 LONG TERM USE OF DRUG: ICD-10-CM

## 2025-03-03 DIAGNOSIS — E78.2 MIXED HYPERLIPIDEMIA: ICD-10-CM

## 2025-03-03 DIAGNOSIS — Z00.00 MEDICARE ANNUAL WELLNESS VISIT, SUBSEQUENT: Primary | ICD-10-CM

## 2025-03-03 DIAGNOSIS — N18.32 TYPE 2 DIABETES MELLITUS WITH STAGE 3B CHRONIC KIDNEY DISEASE, WITHOUT LONG-TERM CURRENT USE OF INSULIN (HCC): ICD-10-CM

## 2025-03-03 DIAGNOSIS — I10 BENIGN HYPERTENSION: ICD-10-CM

## 2025-03-03 LAB — HBA1C MFR BLD: 6.3 %

## 2025-03-03 RX ORDER — DONEPEZIL HYDROCHLORIDE 10 MG/1
TABLET, FILM COATED ORAL
COMMUNITY
Start: 2025-02-20

## 2025-03-03 SDOH — ECONOMIC STABILITY: FOOD INSECURITY: WITHIN THE PAST 12 MONTHS, YOU WORRIED THAT YOUR FOOD WOULD RUN OUT BEFORE YOU GOT MONEY TO BUY MORE.: NEVER TRUE

## 2025-03-03 SDOH — ECONOMIC STABILITY: FOOD INSECURITY: WITHIN THE PAST 12 MONTHS, THE FOOD YOU BOUGHT JUST DIDN'T LAST AND YOU DIDN'T HAVE MONEY TO GET MORE.: NEVER TRUE

## 2025-03-03 ASSESSMENT — PATIENT HEALTH QUESTIONNAIRE - PHQ9
SUM OF ALL RESPONSES TO PHQ QUESTIONS 1-9: 1
7. TROUBLE CONCENTRATING ON THINGS, SUCH AS READING THE NEWSPAPER OR WATCHING TELEVISION: NOT AT ALL
6. FEELING BAD ABOUT YOURSELF - OR THAT YOU ARE A FAILURE OR HAVE LET YOURSELF OR YOUR FAMILY DOWN: NOT AT ALL
5. POOR APPETITE OR OVEREATING: NOT AT ALL
SUM OF ALL RESPONSES TO PHQ QUESTIONS 1-9: 1
10. IF YOU CHECKED OFF ANY PROBLEMS, HOW DIFFICULT HAVE THESE PROBLEMS MADE IT FOR YOU TO DO YOUR WORK, TAKE CARE OF THINGS AT HOME, OR GET ALONG WITH OTHER PEOPLE: NOT DIFFICULT AT ALL
3. TROUBLE FALLING OR STAYING ASLEEP: NOT AT ALL
4. FEELING TIRED OR HAVING LITTLE ENERGY: NOT AT ALL
1. LITTLE INTEREST OR PLEASURE IN DOING THINGS: NOT AT ALL
8. MOVING OR SPEAKING SO SLOWLY THAT OTHER PEOPLE COULD HAVE NOTICED. OR THE OPPOSITE, BEING SO FIGETY OR RESTLESS THAT YOU HAVE BEEN MOVING AROUND A LOT MORE THAN USUAL: NOT AT ALL
SUM OF ALL RESPONSES TO PHQ QUESTIONS 1-9: 1
9. THOUGHTS THAT YOU WOULD BE BETTER OFF DEAD, OR OF HURTING YOURSELF: NOT AT ALL
2. FEELING DOWN, DEPRESSED OR HOPELESS: SEVERAL DAYS
SUM OF ALL RESPONSES TO PHQ QUESTIONS 1-9: 1

## 2025-03-03 ASSESSMENT — LIFESTYLE VARIABLES
HOW OFTEN DO YOU HAVE A DRINK CONTAINING ALCOHOL: NEVER
HOW MANY STANDARD DRINKS CONTAINING ALCOHOL DO YOU HAVE ON A TYPICAL DAY: PATIENT DOES NOT DRINK

## 2025-03-03 ASSESSMENT — ENCOUNTER SYMPTOMS
COUGH: 0
SHORTNESS OF BREATH: 0

## 2025-03-03 NOTE — ASSESSMENT & PLAN NOTE
Chronic, at goal (stable), changes made today: I advised stopping glipizide due to risk of hypoglycemia but the provider at NH is the one writing it.   Could consider low dose januvia 25 mg daily if needs something else.   Form completed with recommendation on it as well as lab results a  I reviewed medications and side effects in detail    Orders:    AMB POC HEMOGLOBIN A1C

## 2025-03-03 NOTE — PROGRESS NOTES
Chief Complaint   Patient presents with    Medicare AWV    6 Month Follow-Up     Check A1C         \"Have you been to the ER, urgent care clinic since your last visit?  Hospitalized since your last visit?\"    Yes [facility]     “Have you seen or consulted any other health care providers outside of [facility] since your last visit?”    Dr joseph irwin [place] [facility], [facility], [facility] [place], dr bond pain management             Click Here for Release of Records Request     Health Maintenance Due   Topic Date Due    Flu vaccine (1) 08/01/2024    COVID-19 Vaccine (6 - 2024-25 season) 09/01/2024    Annual Wellness Visit (Medicare Advantage)  01/01/2025    Lipids  03/18/2025       
Med Hx  Surg Hx  Fam Hx  Sexual   Hx                  
Auto Differential     CBC with Auto Differential     Comprehensive Metabolic Panel        Assessment & Plan  Type 2 diabetes mellitus with stage 3b chronic kidney disease, without long-term current use of insulin (HCC)   Chronic, at goal (stable), changes made today: I advised stopping glipizide due to risk of hypoglycemia but the provider at NH is the one writing it.   Could consider low dose januvia 25 mg daily if needs something else.   Form completed with recommendation on it as well as lab results a  I reviewed medications and side effects in detail    Orders:    AMB POC HEMOGLOBIN A1C    Paroxysmal atrial fibrillation (HCC)   Chronic, at goal (stable), continue current treatment plan         Benign hypertension   Chronic, at goal (stable), continue current treatment plan         Mixed hyperlipidemia   Chronic, at goal (stable), continue current treatment plan  Lab Results   Component Value Date    LDL 61 03/18/2024       Orders:    Lipid Panel; Future    Lipid Panel    Long term use of drug       Orders:    Comprehensive Metabolic Panel; Future    CBC with Auto Differential; Future    CBC with Auto Differential    Comprehensive Metabolic Panel    Medicare annual wellness visit, subsequent            Return in about 6 months (around 9/3/2025) for DM.   I have discussed the diagnosis with the patient and the intended plan as seen in the above orders.  Social history, medical history, and labs were reviewed.  The patient has received an after-visit summary and questions were answered concerning future plans.  I have discussed medication side effects and warnings with the patient as well. Patient verbalized understanding and accepts plan & risks.        Kimberly Quiles MD  [facility]  03/03/25

## 2025-03-03 NOTE — ASSESSMENT & PLAN NOTE
Chronic, at goal (stable), continue current treatment plan  Lab Results   Component Value Date    LDL 61 03/18/2024       Orders:    Lipid Panel; Future    Lipid Panel

## 2025-03-04 LAB
ALBUMIN SERPL-MCNC: 3.9 G/DL (ref 3.5–5)
ALBUMIN/GLOB SERPL: 1.2 (ref 1.1–2.2)
ALP SERPL-CCNC: 104 U/L (ref 45–117)
ALT SERPL-CCNC: 17 U/L (ref 12–78)
ANION GAP SERPL CALC-SCNC: 6 MMOL/L (ref 2–12)
AST SERPL-CCNC: 19 U/L (ref 15–37)
BASOPHILS # BLD: 0.02 K/UL (ref 0–0.1)
BASOPHILS NFR BLD: 0.3 % (ref 0–1)
BILIRUB SERPL-MCNC: 0.3 MG/DL (ref 0.2–1)
BUN SERPL-MCNC: 35 MG/DL (ref 6–20)
BUN/CREAT SERPL: 26 (ref 12–20)
CALCIUM SERPL-MCNC: 9.5 MG/DL (ref 8.5–10.1)
CHLORIDE SERPL-SCNC: 112 MMOL/L (ref 97–108)
CHOLEST SERPL-MCNC: 187 MG/DL
CO2 SERPL-SCNC: 24 MMOL/L (ref 21–32)
CREAT SERPL-MCNC: 1.36 MG/DL (ref 0.55–1.02)
DIFFERENTIAL METHOD BLD: NORMAL
EOSINOPHIL # BLD: 0.16 K/UL (ref 0–0.4)
EOSINOPHIL NFR BLD: 2.3 % (ref 0–7)
ERYTHROCYTE [DISTWIDTH] IN BLOOD BY AUTOMATED COUNT: 14.3 % (ref 11.5–14.5)
GLOBULIN SER CALC-MCNC: 3.2 G/DL (ref 2–4)
GLUCOSE SERPL-MCNC: 91 MG/DL (ref 65–100)
HCT VFR BLD AUTO: 39 % (ref 35–47)
HDLC SERPL-MCNC: 55 MG/DL
HDLC SERPL: 3.4 (ref 0–5)
HGB BLD-MCNC: 11.8 G/DL (ref 11.5–16)
IMM GRANULOCYTES # BLD AUTO: 0.02 K/UL (ref 0–0.04)
IMM GRANULOCYTES NFR BLD AUTO: 0.3 % (ref 0–0.5)
LDLC SERPL CALC-MCNC: 96.2 MG/DL (ref 0–100)
LYMPHOCYTES # BLD: 2.11 K/UL (ref 0.8–3.5)
LYMPHOCYTES NFR BLD: 29.8 % (ref 12–49)
MCH RBC QN AUTO: 28.2 PG (ref 26–34)
MCHC RBC AUTO-ENTMCNC: 30.3 G/DL (ref 30–36.5)
MCV RBC AUTO: 93.1 FL (ref 80–99)
MONOCYTES # BLD: 0.62 K/UL (ref 0–1)
MONOCYTES NFR BLD: 8.8 % (ref 5–13)
NEUTS SEG # BLD: 4.14 K/UL (ref 1.8–8)
NEUTS SEG NFR BLD: 58.5 % (ref 32–75)
NRBC # BLD: 0 K/UL (ref 0–0.01)
NRBC BLD-RTO: 0 PER 100 WBC
PLATELET # BLD AUTO: 257 K/UL (ref 150–400)
PMV BLD AUTO: 10.5 FL (ref 8.9–12.9)
POTASSIUM SERPL-SCNC: 5.4 MMOL/L (ref 3.5–5.1)
PROT SERPL-MCNC: 7.1 G/DL (ref 6.4–8.2)
RBC # BLD AUTO: 4.19 M/UL (ref 3.8–5.2)
SODIUM SERPL-SCNC: 142 MMOL/L (ref 136–145)
TRIGL SERPL-MCNC: 179 MG/DL
VLDLC SERPL CALC-MCNC: 35.8 MG/DL
WBC # BLD AUTO: 7.1 K/UL (ref 3.6–11)

## 2025-05-20 ENCOUNTER — TELEPHONE (OUTPATIENT)
Facility: CLINIC | Age: 89
End: 2025-05-20

## 2025-05-20 NOTE — TELEPHONE ENCOUNTER
Returned call. Call transferred and had to leave voicemail.  Dr. Quiles is not the prescriber of Aricept.

## 2025-05-20 NOTE — TELEPHONE ENCOUNTER
Palma called to get clarification on the following medication:    -donepezil (ARICEPT) 10 MG      Has medication been d/c or does pt suppose to take Take 1 tablet by mouth nightly, etc?    Please advise...

## 2025-05-27 ENCOUNTER — TELEPHONE (OUTPATIENT)
Facility: CLINIC | Age: 89
End: 2025-05-27

## 2025-05-27 NOTE — TELEPHONE ENCOUNTER
Attempted to call. No answer. Message left.    Dr. Quiles is not the prescriber of gabapentin for patient.

## 2025-05-27 NOTE — TELEPHONE ENCOUNTER
gabapentin (NEURONTIN) 100 MG capsule   Yolette states pt need a refill on meds. [facility] to [facility]. Please advise.

## 2025-05-29 ENCOUNTER — TELEPHONE (OUTPATIENT)
Facility: CLINIC | Age: 89
End: 2025-05-29

## 2025-05-29 DIAGNOSIS — M25.512 CHRONIC LEFT SHOULDER PAIN: ICD-10-CM

## 2025-05-29 DIAGNOSIS — Z98.890 STATUS POST LUMBAR SPINE OPERATIVE PROCEDURE FOR DECOMPRESSION OF SPINAL CORD: ICD-10-CM

## 2025-05-29 DIAGNOSIS — G89.29 CHRONIC LEFT SHOULDER PAIN: ICD-10-CM

## 2025-05-29 DIAGNOSIS — Z98.890 STATUS POST LUMBAR SPINE OPERATIVE PROCEDURE FOR DECOMPRESSION OF SPINAL CORD: Primary | ICD-10-CM

## 2025-05-29 DIAGNOSIS — M54.2 CERVICALGIA: ICD-10-CM

## 2025-05-29 RX ORDER — GABAPENTIN 100 MG/1
200 CAPSULE ORAL EVERY MORNING
Qty: 180 CAPSULE | Refills: 0 | Status: SHIPPED | OUTPATIENT
Start: 2025-05-29 | End: 2025-05-30 | Stop reason: SDUPTHER

## 2025-05-29 NOTE — TELEPHONE ENCOUNTER
gabapentin (NEURONTIN) 100 MG capsule   Alexis states he needs clarifications on pt med. States pt was taking 2 capsules twice daily equaling 400 mg. States today Rx is stating 2 capsules every morning, but also states to take 200 mg twice daily (400mg). Pharmacy would like to know how should pt properly take med. When calling pharmacy back say Red Team and ask for Alexis. Please advise.

## 2025-05-29 NOTE — PROGRESS NOTES
Patient's daughter states that [facility] provider will no longer write meds as she would not use their insurance so she will be coming here exclusively from now on. Requests refill of gabapentin. Last seen 3/25. Scheduled appt for next week. Called in gabapentin #90 days.

## 2025-05-30 RX ORDER — GABAPENTIN 100 MG/1
200 CAPSULE ORAL 2 TIMES DAILY
Qty: 120 CAPSULE | Refills: 0 | Status: SHIPPED | OUTPATIENT
Start: 2025-05-30 | End: 2025-08-28

## 2025-06-04 ENCOUNTER — TELEPHONE (OUTPATIENT)
Facility: CLINIC | Age: 89
End: 2025-06-04

## 2025-06-04 NOTE — TELEPHONE ENCOUNTER
Patient daughter Cari called, no answer. Message left for return call.    Patient appointment for today cancelled. Received notification of Urine cx results from [facility] that needs to be addressed at [facility] as soon as possible, as well as controlled substance rx for Gabapentin.     Need to have sooner appointment than 7/1/25, will work in tomorrow 6/5/25.

## 2025-06-05 ENCOUNTER — OFFICE VISIT (OUTPATIENT)
Facility: CLINIC | Age: 89
End: 2025-06-05
Payer: MEDICARE

## 2025-06-05 VITALS
DIASTOLIC BLOOD PRESSURE: 53 MMHG | TEMPERATURE: 97.6 F | OXYGEN SATURATION: 99 % | WEIGHT: 161 LBS | HEIGHT: 60 IN | HEART RATE: 62 BPM | BODY MASS INDEX: 31.61 KG/M2 | RESPIRATION RATE: 16 BRPM | SYSTOLIC BLOOD PRESSURE: 114 MMHG

## 2025-06-05 DIAGNOSIS — Z09 HOSPITAL DISCHARGE FOLLOW-UP: ICD-10-CM

## 2025-06-05 DIAGNOSIS — E11.22 TYPE 2 DIABETES MELLITUS WITH STAGE 4 CHRONIC KIDNEY DISEASE, WITHOUT LONG-TERM CURRENT USE OF INSULIN (HCC): ICD-10-CM

## 2025-06-05 DIAGNOSIS — N30.00 ACUTE CYSTITIS WITHOUT HEMATURIA: Primary | ICD-10-CM

## 2025-06-05 DIAGNOSIS — N18.4 TYPE 2 DIABETES MELLITUS WITH STAGE 4 CHRONIC KIDNEY DISEASE, WITHOUT LONG-TERM CURRENT USE OF INSULIN (HCC): ICD-10-CM

## 2025-06-05 PROCEDURE — 99214 OFFICE O/P EST MOD 30 MIN: CPT | Performed by: FAMILY MEDICINE

## 2025-06-05 PROCEDURE — 3044F HG A1C LEVEL LT 7.0%: CPT | Performed by: FAMILY MEDICINE

## 2025-06-05 PROCEDURE — 1123F ACP DISCUSS/DSCN MKR DOCD: CPT | Performed by: FAMILY MEDICINE

## 2025-06-05 PROCEDURE — 1111F DSCHRG MED/CURRENT MED MERGE: CPT | Performed by: FAMILY MEDICINE

## 2025-06-05 PROCEDURE — 1160F RVW MEDS BY RX/DR IN RCRD: CPT | Performed by: FAMILY MEDICINE

## 2025-06-05 PROCEDURE — 1159F MED LIST DOCD IN RCRD: CPT | Performed by: FAMILY MEDICINE

## 2025-06-05 PROCEDURE — G2211 COMPLEX E/M VISIT ADD ON: HCPCS | Performed by: FAMILY MEDICINE

## 2025-06-05 RX ORDER — SULFAMETHOXAZOLE AND TRIMETHOPRIM 400; 80 MG/1; MG/1
1 TABLET ORAL 2 TIMES DAILY
Qty: 6 TABLET | Refills: 0 | Status: SHIPPED | OUTPATIENT
Start: 2025-06-05 | End: 2025-06-08

## 2025-06-05 RX ORDER — SULFAMETHOXAZOLE AND TRIMETHOPRIM 200; 40 MG/5ML; MG/5ML
SUSPENSION ORAL
Refills: 0 | Status: CANCELLED | OUTPATIENT
Start: 2025-06-05

## 2025-06-05 RX ORDER — QUETIAPINE FUMARATE 25 MG/1
TABLET, FILM COATED ORAL
COMMUNITY
Start: 2025-05-22

## 2025-06-05 RX ORDER — GLIPIZIDE 5 MG/1
TABLET ORAL
COMMUNITY
Start: 2025-05-20 | End: 2025-06-05 | Stop reason: SINTOL

## 2025-06-05 RX ORDER — AZELASTINE 1 MG/ML
SPRAY, METERED NASAL
COMMUNITY
Start: 2025-03-25

## 2025-06-05 RX ORDER — GLUCOSAMINE HCL/CHONDROITIN SU 500-400 MG
CAPSULE ORAL
COMMUNITY
Start: 2025-05-22

## 2025-06-05 ASSESSMENT — ENCOUNTER SYMPTOMS
VOMITING: 0
NAUSEA: 0

## 2025-06-05 NOTE — PROGRESS NOTES
Chief Complaint   Patient presents with    3 Month Follow-Up        \"Have you been to the ER, urgent care clinic since your last visit?  Hospitalized since your last visit?\"    Yes [facility]     “Have you seen or consulted any other health care providers outside of [facility] since your last visit?”    [facility]             Click Here for Release of Records Request     Health Maintenance Due   Topic Date Due    COVID-19 Vaccine (6 - 2024-25 season) 09/01/2024       
capsule by mouth daily      sertraline (ZOLOFT) 100 MG tablet Take 1 tablet by mouth daily      simvastatin (ZOCOR) 20 MG tablet Take 1 tablet by mouth nightly       No current facility-administered medications on file prior to visit.       Patient Active Problem List   Diagnosis    Allergic rhinitis due to pollen    Obesity    Type 2 diabetes mellitus with diabetic chronic kidney disease (HCC)    Seborrheic keratosis    Depression    Atrial fibrillation (HCC)    Other specified anemias    Hyperlipidemia    Neuropathy    Sleep apnea    Glaucoma    Chronic obstructive pulmonary disease (HCC)    Benign hypertension    Severe dementia with anxiety, unspecified dementia type (HCC)    At high risk for falls    Osteoarthritis of right glenohumeral joint    Incontinence in female    GERD (gastroesophageal reflux disease)    Eyelid cancer, left    Distal radius fracture, right    Cataracts, both eyes    Carpal tunnel syndrome, bilateral    Anxiety    History of basal cell carcinoma of eyelid    Diabetic retinopathy associated with type 2 diabetes mellitus (HCC)    History of glaucoma    Osteopenia    Status post lumbar spine operative procedure for decompression of spinal cord       Social History     Socioeconomic History    Marital status:      Spouse name: None    Number of children: None    Years of education: None    Highest education level: None   Tobacco Use    Smoking status: Former     Current packs/day: 0.00     Types: Cigarettes     Quit date: 1975     Years since quittin.4    Smokeless tobacco: Never   Substance and Sexual Activity    Alcohol use: No    Drug use: No    Sexual activity: Not Currently     Social Drivers of Health     Financial Resource Strain: Low Risk  (2024)    Overall Financial Resource Strain (CARDIA)     Difficulty of Paying Living Expenses: Not hard at all   Food Insecurity: No Food Insecurity (3/3/2025)    Hunger Vital Sign     Worried About Running Out of Food in the

## 2025-06-05 NOTE — ASSESSMENT & PLAN NOTE
Chronic, at goal (stable), changes made today: d/c glipizide due to risk of hypoglycemia, monitor fsbs, consider glp1 in future if needs treatment, changed to diabetic diet       See scanned order, facility note.

## 2025-06-18 ENCOUNTER — PATIENT MESSAGE (OUTPATIENT)
Facility: CLINIC | Age: 89
End: 2025-06-18

## 2025-06-18 DIAGNOSIS — F41.9 ANXIETY: Primary | ICD-10-CM

## 2025-06-21 RX ORDER — LORAZEPAM 0.5 MG/1
0.5 TABLET ORAL DAILY PRN
Qty: 2 TABLET | Refills: 0 | Status: SHIPPED | OUTPATIENT
Start: 2025-06-21 | End: 2025-07-21

## 2025-07-02 ENCOUNTER — TELEPHONE (OUTPATIENT)
Facility: CLINIC | Age: 89
End: 2025-07-02

## 2025-07-02 DIAGNOSIS — F41.9 ANXIETY: ICD-10-CM

## 2025-07-02 RX ORDER — LORAZEPAM 0.5 MG/1
0.5 TABLET ORAL EVERY 8 HOURS PRN
Qty: 4 TABLET | Refills: 0 | Status: SHIPPED | OUTPATIENT
Start: 2025-07-02 | End: 2025-07-03 | Stop reason: SDUPTHER

## 2025-07-02 NOTE — TELEPHONE ENCOUNTER
Pt's dtr called back stating she did not  the last medication from [facility]. She says the pt is very confused and is not doing well with being away from home. She states she is going to need something to help keep her calm on the ride home from [place]. If the Dr could please send in an Rx to the [facility] in [facility] she would greatly appreciate it. Please advise.

## 2025-07-02 NOTE — TELEPHONE ENCOUNTER
Pt daughter stated pt isn't doing so well in [place]. Stated pt is unfamiliar with area and will like to come back home. Cari would like for pcp to send 1-2 tablets of sedative for pt to have for the drive back. Cari will like a call back when meds are placed. Please advise.     Jake on [address] [place] [address]

## 2025-07-03 ENCOUNTER — PATIENT MESSAGE (OUTPATIENT)
Facility: CLINIC | Age: 89
End: 2025-07-03

## 2025-07-03 DIAGNOSIS — F41.9 ANXIETY: ICD-10-CM

## 2025-07-03 RX ORDER — LORAZEPAM 0.5 MG/1
0.5 TABLET ORAL EVERY 8 HOURS PRN
Qty: 4 TABLET | Refills: 0 | Status: SHIPPED | OUTPATIENT
Start: 2025-07-03 | End: 2025-08-02

## 2025-07-03 NOTE — TELEPHONE ENCOUNTER
Spoke to Cari. Advised rx sent. She states that patient had already left before rx was sent this morning.

## 2025-08-11 DIAGNOSIS — Z98.890 STATUS POST LUMBAR SPINE OPERATIVE PROCEDURE FOR DECOMPRESSION OF SPINAL CORD: ICD-10-CM

## 2025-08-11 DIAGNOSIS — M54.2 CERVICALGIA: ICD-10-CM

## 2025-08-11 DIAGNOSIS — G89.29 CHRONIC LEFT SHOULDER PAIN: ICD-10-CM

## 2025-08-11 DIAGNOSIS — M25.512 CHRONIC LEFT SHOULDER PAIN: ICD-10-CM

## 2025-08-11 RX ORDER — GABAPENTIN 100 MG/1
200 CAPSULE ORAL 2 TIMES DAILY
Qty: 360 CAPSULE | Refills: 0 | Status: SHIPPED | OUTPATIENT
Start: 2025-08-11 | End: 2025-11-09

## 2025-09-03 ENCOUNTER — OFFICE VISIT (OUTPATIENT)
Facility: CLINIC | Age: 89
End: 2025-09-03
Payer: MEDICARE

## 2025-09-03 VITALS
HEIGHT: 60 IN | RESPIRATION RATE: 18 BRPM | WEIGHT: 163 LBS | DIASTOLIC BLOOD PRESSURE: 82 MMHG | BODY MASS INDEX: 32 KG/M2 | SYSTOLIC BLOOD PRESSURE: 122 MMHG | HEART RATE: 61 BPM | OXYGEN SATURATION: 98 % | TEMPERATURE: 98 F

## 2025-09-03 DIAGNOSIS — N18.4 TYPE 2 DIABETES MELLITUS WITH STAGE 4 CHRONIC KIDNEY DISEASE, WITHOUT LONG-TERM CURRENT USE OF INSULIN (HCC): Primary | ICD-10-CM

## 2025-09-03 DIAGNOSIS — N76.0 ACUTE VAGINITIS: ICD-10-CM

## 2025-09-03 DIAGNOSIS — Z79.899 LONG TERM USE OF DRUG: ICD-10-CM

## 2025-09-03 DIAGNOSIS — E11.22 TYPE 2 DIABETES MELLITUS WITH STAGE 4 CHRONIC KIDNEY DISEASE, WITHOUT LONG-TERM CURRENT USE OF INSULIN (HCC): Primary | ICD-10-CM

## 2025-09-03 DIAGNOSIS — I10 BENIGN HYPERTENSION: ICD-10-CM

## 2025-09-03 DIAGNOSIS — I48.0 PAROXYSMAL ATRIAL FIBRILLATION (HCC): ICD-10-CM

## 2025-09-03 PROCEDURE — 99214 OFFICE O/P EST MOD 30 MIN: CPT | Performed by: FAMILY MEDICINE

## 2025-09-03 PROCEDURE — 1159F MED LIST DOCD IN RCRD: CPT | Performed by: FAMILY MEDICINE

## 2025-09-03 PROCEDURE — 3044F HG A1C LEVEL LT 7.0%: CPT | Performed by: FAMILY MEDICINE

## 2025-09-03 PROCEDURE — 1126F AMNT PAIN NOTED NONE PRSNT: CPT | Performed by: FAMILY MEDICINE

## 2025-09-03 PROCEDURE — 1123F ACP DISCUSS/DSCN MKR DOCD: CPT | Performed by: FAMILY MEDICINE

## 2025-09-03 PROCEDURE — 1160F RVW MEDS BY RX/DR IN RCRD: CPT | Performed by: FAMILY MEDICINE

## 2025-09-03 PROCEDURE — G2211 COMPLEX E/M VISIT ADD ON: HCPCS | Performed by: FAMILY MEDICINE

## 2025-09-03 RX ORDER — METOPROLOL TARTRATE 50 MG
50 TABLET ORAL 2 TIMES DAILY
Qty: 180 TABLET | Refills: 1 | Status: SHIPPED | OUTPATIENT
Start: 2025-09-03

## 2025-09-03 RX ORDER — NYSTATIN 100000 U/G
CREAM TOPICAL
Qty: 30 G | Refills: 1 | Status: SHIPPED | OUTPATIENT
Start: 2025-09-03

## 2025-09-03 RX ORDER — PSEUDOEPHEDRINE HCL 30 MG
100 TABLET ORAL DAILY
Qty: 30 CAPSULE | Refills: 1 | Status: SHIPPED | OUTPATIENT
Start: 2025-09-03

## 2025-09-03 ASSESSMENT — ENCOUNTER SYMPTOMS
SHORTNESS OF BREATH: 0
COUGH: 0

## 2025-09-04 ENCOUNTER — RESULTS FOLLOW-UP (OUTPATIENT)
Facility: CLINIC | Age: 89
End: 2025-09-04

## 2025-09-04 LAB
ALBUMIN SERPL-MCNC: 3.5 G/DL (ref 3.5–5.2)
ALBUMIN/GLOB SERPL: 1.2 (ref 1.1–2.2)
ALP SERPL-CCNC: 119 U/L (ref 35–104)
ALT SERPL-CCNC: 10 U/L (ref 10–35)
ANION GAP SERPL CALC-SCNC: 15 MMOL/L (ref 2–14)
AST SERPL-CCNC: 23 U/L (ref 10–35)
BASOPHILS # BLD: 0.03 K/UL (ref 0–0.1)
BASOPHILS NFR BLD: 0.4 % (ref 0–1)
BILIRUB SERPL-MCNC: 0.3 MG/DL (ref 0–1.2)
BUN SERPL-MCNC: 34 MG/DL (ref 8–23)
BUN/CREAT SERPL: 27 (ref 12–20)
CALCIUM SERPL-MCNC: 8.9 MG/DL (ref 8.2–9.6)
CHLORIDE SERPL-SCNC: 106 MMOL/L (ref 98–107)
CO2 SERPL-SCNC: 21 MMOL/L (ref 20–29)
CREAT SERPL-MCNC: 1.25 MG/DL (ref 0.6–1)
DIFFERENTIAL METHOD BLD: ABNORMAL
EOSINOPHIL # BLD: 0.17 K/UL (ref 0–0.4)
EOSINOPHIL NFR BLD: 2.5 % (ref 0–7)
ERYTHROCYTE [DISTWIDTH] IN BLOOD BY AUTOMATED COUNT: 14 % (ref 11.5–14.5)
EST. AVERAGE GLUCOSE BLD GHB EST-MCNC: 181 MG/DL
GLOBULIN SER CALC-MCNC: 2.9 G/DL (ref 2–4)
GLUCOSE SERPL-MCNC: 250 MG/DL (ref 65–100)
HBA1C MFR BLD: 7.9 % (ref 4–5.6)
HCT VFR BLD AUTO: 35.8 % (ref 35–47)
HGB BLD-MCNC: 10.9 G/DL (ref 11.5–16)
IMM GRANULOCYTES # BLD AUTO: 0.02 K/UL (ref 0–0.04)
IMM GRANULOCYTES NFR BLD AUTO: 0.3 % (ref 0–0.5)
LYMPHOCYTES # BLD: 1.39 K/UL (ref 0.8–3.5)
LYMPHOCYTES NFR BLD: 20.6 % (ref 12–49)
MCH RBC QN AUTO: 28.8 PG (ref 26–34)
MCHC RBC AUTO-ENTMCNC: 30.4 G/DL (ref 30–36.5)
MCV RBC AUTO: 94.5 FL (ref 80–99)
MONOCYTES # BLD: 0.67 K/UL (ref 0–1)
MONOCYTES NFR BLD: 9.9 % (ref 5–13)
NEUTS SEG # BLD: 4.48 K/UL (ref 1.8–8)
NEUTS SEG NFR BLD: 66.3 % (ref 32–75)
NRBC # BLD: 0 K/UL (ref 0–0.01)
NRBC BLD-RTO: 0 PER 100 WBC
PLATELET # BLD AUTO: 210 K/UL (ref 150–400)
PMV BLD AUTO: 11 FL (ref 8.9–12.9)
POTASSIUM SERPL-SCNC: 5.2 MMOL/L (ref 3.5–5.1)
PROT SERPL-MCNC: 6.4 G/DL (ref 6.4–8.3)
RBC # BLD AUTO: 3.79 M/UL (ref 3.8–5.2)
SODIUM SERPL-SCNC: 142 MMOL/L (ref 136–145)
WBC # BLD AUTO: 6.8 K/UL (ref 3.6–11)